# Patient Record
Sex: FEMALE | Race: WHITE | NOT HISPANIC OR LATINO | Employment: FULL TIME | URBAN - METROPOLITAN AREA
[De-identification: names, ages, dates, MRNs, and addresses within clinical notes are randomized per-mention and may not be internally consistent; named-entity substitution may affect disease eponyms.]

---

## 2018-11-26 ENCOUNTER — HOSPITAL ENCOUNTER (EMERGENCY)
Facility: HOSPITAL | Age: 28
Discharge: HOME | End: 2018-11-26
Attending: EMERGENCY MEDICINE | Admitting: EMERGENCY MEDICINE
Payer: COMMERCIAL

## 2018-11-26 VITALS
TEMPERATURE: 98.7 F | HEART RATE: 66 BPM | RESPIRATION RATE: 16 BRPM | OXYGEN SATURATION: 97 % | WEIGHT: 230 LBS | DIASTOLIC BLOOD PRESSURE: 63 MMHG | SYSTOLIC BLOOD PRESSURE: 113 MMHG

## 2018-11-26 DIAGNOSIS — N93.9 VAGINAL BLEEDING: Primary | ICD-10-CM

## 2018-11-26 DIAGNOSIS — R10.2 PELVIC CRAMPING: ICD-10-CM

## 2018-11-26 LAB
BACTERIA UR QL AUTO: ABNORMAL /HPF
BASOPHILS # BLD AUTO: 0.03 THOUSANDS/ΜL (ref 0–0.1)
BASOPHILS NFR BLD AUTO: 0 % (ref 0–1)
BILIRUB UR QL STRIP: NEGATIVE
CLARITY UR: ABNORMAL
COLOR UR: ABNORMAL
EOSINOPHIL # BLD AUTO: 0.2 THOUSAND/ΜL (ref 0–0.61)
EOSINOPHIL NFR BLD AUTO: 2 % (ref 0–6)
ERYTHROCYTE [DISTWIDTH] IN BLOOD BY AUTOMATED COUNT: 13.1 % (ref 11.6–15.1)
EXT PREG TEST URINE: NEGATIVE
GLUCOSE UR STRIP-MCNC: NEGATIVE MG/DL
HCT VFR BLD AUTO: 38.6 % (ref 34.8–46.1)
HGB BLD-MCNC: 12.8 G/DL (ref 11.5–15.4)
HGB UR QL STRIP.AUTO: ABNORMAL
HYALINE CASTS #/AREA URNS LPF: ABNORMAL /LPF
IMM GRANULOCYTES # BLD AUTO: 0.04 THOUSAND/UL (ref 0–0.2)
IMM GRANULOCYTES NFR BLD AUTO: 0 % (ref 0–2)
KETONES UR STRIP-MCNC: ABNORMAL MG/DL
LEUKOCYTE ESTERASE UR QL STRIP: ABNORMAL
LYMPHOCYTES # BLD AUTO: 3.18 THOUSANDS/ΜL (ref 0.6–4.47)
LYMPHOCYTES NFR BLD AUTO: 31 % (ref 14–44)
MCH RBC QN AUTO: 30 PG (ref 26.8–34.3)
MCHC RBC AUTO-ENTMCNC: 33.2 G/DL (ref 31.4–37.4)
MCV RBC AUTO: 91 FL (ref 82–98)
MONOCYTES # BLD AUTO: 0.85 THOUSAND/ΜL (ref 0.17–1.22)
MONOCYTES NFR BLD AUTO: 8 % (ref 4–12)
NEUTROPHILS # BLD AUTO: 6.1 THOUSANDS/ΜL (ref 1.85–7.62)
NEUTS SEG NFR BLD AUTO: 59 % (ref 43–75)
NITRITE UR QL STRIP: NEGATIVE
NON-SQ EPI CELLS URNS QL MICRO: ABNORMAL /HPF
NRBC BLD AUTO-RTO: 0 /100 WBCS
PH UR STRIP.AUTO: 5.5 [PH] (ref 4.5–8)
PLATELET # BLD AUTO: 345 THOUSANDS/UL (ref 149–390)
PMV BLD AUTO: 9.8 FL (ref 8.9–12.7)
PROT UR STRIP-MCNC: ABNORMAL MG/DL
RBC # BLD AUTO: 4.26 MILLION/UL (ref 3.81–5.12)
RBC #/AREA URNS AUTO: ABNORMAL /HPF
SP GR UR STRIP.AUTO: 1.03 (ref 1–1.03)
UROBILINOGEN UR QL STRIP.AUTO: 0.2 E.U./DL
WBC # BLD AUTO: 10.4 THOUSAND/UL (ref 4.31–10.16)
WBC #/AREA URNS AUTO: ABNORMAL /HPF

## 2018-11-26 PROCEDURE — 81001 URINALYSIS AUTO W/SCOPE: CPT

## 2018-11-26 PROCEDURE — 85025 COMPLETE CBC W/AUTO DIFF WBC: CPT | Performed by: EMERGENCY MEDICINE

## 2018-11-26 PROCEDURE — 99284 EMERGENCY DEPT VISIT MOD MDM: CPT

## 2018-11-26 PROCEDURE — 36415 COLL VENOUS BLD VENIPUNCTURE: CPT | Performed by: EMERGENCY MEDICINE

## 2018-11-26 PROCEDURE — 81025 URINE PREGNANCY TEST: CPT | Performed by: EMERGENCY MEDICINE

## 2018-11-26 PROCEDURE — 87086 URINE CULTURE/COLONY COUNT: CPT

## 2018-11-26 NOTE — ED ATTENDING ATTESTATION
Miguel Marcelino DO, saw and evaluated the patient  I have discussed the patient with the resident/non-physician practitioner and agree with the resident's/non-physician practitioner's findings, Plan of Care, and MDM as documented in the resident's/non-physician practitioner's note, except where noted  All available labs and Radiology studies were reviewed  At this point I agree with the current assessment done in the Emergency Department  I have conducted an independent evaluation of this patient a history and physical is as follows:      35 yo female with hx of AUB presents for evaluation of heavy vaginal bleeding going through 2-3 pads/hr with passage of clots  Has mild suprapubic cramping pain  Non radiating, no a/e factors  Imp: menometrorrhagia plan: pelvic exam, hgb  Reassess  Consider oral TXA if pt to be discharged        Critical Care Time  CritCare Time    Procedures

## 2018-11-26 NOTE — DISCHARGE INSTRUCTIONS
Dysfunctional Uterine Bleeding   WHAT YOU NEED TO KNOW:   Dysfunctional uterine bleeding (DUB) is abnormal uterine bleeding that is caused by a problem with your hormones  You may have bleeding from your uterus at times other than your normal monthly period  Your monthly periods may last longer or shorter, and bleeding may be heavier or lighter than usual    DISCHARGE INSTRUCTIONS:   Medicines:   · Hormones  help decrease bleeding by making your monthly periods more regular  Sometimes this medicine may be given as birth control pills  · NSAIDs  help decrease swelling, pain, and fever  This medicine is available with or without a doctor's order  NSAIDs can cause stomach bleeding or kidney problems in certain people  If you take blood thinner medicine, always ask your healthcare provider if NSAIDs are safe for you  Always read the medicine label and follow directions  · Iron supplements  may be given if your blood iron level decreases because of heavy bleeding  Iron may make you constipated  Ask your healthcare provider for ways to prevent or treat constipation  Iron may also make your bowel movements turn dark or black  · Take your medicine as directed  Contact your healthcare provider if you think your medicine is not helping or if you have side effects  Tell him or her if you are allergic to any medicine  Keep a list of the medicines, vitamins, and herbs you take  Include the amounts, and when and why you take them  Bring the list or the pill bottles to follow-up visits  Carry your medicine list with you in case of an emergency  Follow up with your healthcare provider as directed:  Write down your questions so you remember to ask them during your visits  Self-care:   · Apply heat  on your lower abdomen for 20 to 30 minutes every 2 hours for as many days as directed  Heat helps decrease pain and muscle spasms  · Include foods high in iron  if needed   Examples of foods high in iron are leafy green vegetables, beef, pork, liver, eggs, and whole-grain breads and cereals  · Keep a diary of your menstrual cycles  Keep track of the number of tampons or pads you use each day  · Talk to your healthcare provider before you start a weight loss program   You may need to wait until the abnormal bleeding has stopped before you try to lose weight  The amount of iron in your blood should be normal before you lose weight  Contact your healthcare provider if:   · You need to change your sanitary pad or tampon more than once an hour  · Your medicine causes nausea, vomiting, or diarrhea  · You have questions or concerns about your condition or care  Return to the emergency department if:   · You continue to bleed heavily, or you feel faint  © 2017 [employer]  Information is for End User's use only and may not be sold, redistributed or otherwise used for commercial purposes  All illustrations and images included in CareNotes® are the copyrighted property of [employer]  or [employer]  The above information is an  only  It is not intended as medical advice for individual conditions or treatments  Talk to your doctor, nurse or pharmacist before following any medical regimen to see if it is safe and effective for you  Menorrhagia   WHAT YOU NEED TO KNOW:   Menorrhagia is heavy menstrual bleeding for more than 7 days or severe menstrual bleeding for less than 7 days  Your menstrual bleeding and cramping are so heavy that you have trouble doing your usual daily activities  Your monthly period may also occur more often, and you may bleed between periods  Menorrhagia is common in adolescence and around menopause  DISCHARGE INSTRUCTIONS:   Call 911 for any of the following:   · You have chest pain and shortness of breath  · Your heart is fluttering or beating faster than usual for you  Return to the emergency department if:   · You feel dizzy when you stand  · You feel confused  · You have severe abdominal pain, nausea, and vomiting  · Your skin or the whites of your eyes turn yellow  Contact your healthcare provider if:   · You need to change your pad or tampon more than 1 time per hour, for several hours in a row  · You feel more weak and tired than usual      · You have new coldness in your hands and feet  · You have questions or concerns about your condition or care  Medicines: You may need any of the following:  · Iron supplements  may be given if your blood iron level decreases because of heavy bleeding  · NSAIDs , such as ibuprofen, treat menstrual cramps  This medicine is available with or without a doctor's order  NSAIDs can cause stomach bleeding or kidney problems in certain people  If you take blood thinner medicine, always ask your healthcare provider if NSAIDs are safe for you  Always read the medicine label and follow directions  · Hormones  help slow or stop your bleeding and make your monthly periods more regular  This medicine may be given as birth control pills or an intrauterine device (IUD)  · Take your medicine as directed  Contact your healthcare provider if you think your medicine is not helping or if you have side effects  Tell him of her if you are allergic to any medicine  Keep a list of the medicines, vitamins, and herbs you take  Include the amounts, and when and why you take them  Bring the list or the pill bottles to follow-up visits  Carry your medicine list with you in case of an emergency  Manage your symptoms:   · Keep a supply of pads or tampons with you at all times  If possible, stay close to a bathroom  · Apply heat  on your abdomen for 20 to 30 minutes every 2 hours for as many days as directed  Heat helps decrease pain and cramps  Follow up with your healthcare provider as directed: You may need regular pelvic exams with Pap smears to monitor your condition   Write down your questions so you remember to ask them during your visits  © 2017 [employer] Information is for End User's use only and may not be sold, redistributed or otherwise used for commercial purposes  All illustrations and images included in CareNotes® are the copyrighted property of [employer]  or [employer]  The above information is an  only  It is not intended as medical advice for individual conditions or treatments  Talk to your doctor, nurse or pharmacist before following any medical regimen to see if it is safe and effective for you

## 2018-11-26 NOTE — ED PROVIDER NOTES
History  Chief Complaint   Patient presents with    Vaginal Bleeding     pt has been having heavy vaginal bleeding for the past 48 hours going 2-3 maxi pads an hour with clots the size of a half dollar  sent here by obgyn  pt reports taking plan b 1 5 weeks ago  HPI    26-year-old female pm[facility]x medical  in 2018, irregular periods presents for evaluation of vaginal bleeding  Patient says she has had heavy vaginal bleeding for last 2 days, going through 2-3 pads/hr and has noticed small clots  Denies bleeding heavily before  LMP 10/11/2018 and was light but lasted 30 days  She notes lower abdominal cramping that feels similar to a menstrual cycle  Denies headache, lightheadedness, fever, chills, chest pain, shortness of breath, diarrhea, dysuria, or hematuria  None       History reviewed  No pertinent past medical history  Past Surgical History:   Procedure Laterality Date    INDUCED          History reviewed  No pertinent family history  I have reviewed and agree with the history as documented  Social History   Substance Use Topics    Smoking status: Current Every Day Smoker     Packs/day: 0 20    Smokeless tobacco: Never Used    Alcohol use Yes      Comment: socially        Review of Systems   Constitutional: Negative for chills, diaphoresis, fatigue and fever  HENT: Negative for congestion, rhinorrhea and sore throat  Eyes: Negative for photophobia and visual disturbance  Respiratory: Negative for cough, chest tightness and shortness of breath  Cardiovascular: Negative for chest pain and palpitations  Gastrointestinal: Negative for abdominal pain, blood in stool, constipation, diarrhea, nausea and vomiting  Genitourinary: Positive for pelvic pain and vaginal bleeding  Negative for dysuria, frequency and hematuria  Musculoskeletal: Negative for back pain, gait problem, myalgias, neck pain and neck stiffness  Skin: Negative for pallor and rash     Neurological: Negative for weakness, light-headedness, numbness and headaches  Hematological: Negative for adenopathy  Does not bruise/bleed easily  All other systems reviewed and are negative  Physical Exam  ED Triage Vitals   Temperature Pulse Respirations Blood Pressure SpO2   11/26/18 1542 11/26/18 1537 11/26/18 1537 11/26/18 1537 11/26/18 1537   98 7 °F (37 1 °C) 72 18 141/76 98 %      Temp Source Heart Rate Source Patient Position - Orthostatic VS BP Location FiO2 (%)   11/26/18 1542 11/26/18 1537 11/26/18 1647 11/26/18 1647 --   Tympanic Monitor Sitting Right arm       Pain Score       --                  Orthostatic Vital Signs  Vitals:    11/26/18 1537 11/26/18 1647 11/26/18 1700   BP: 141/76 (!) 98/49 113/63   Pulse: 72 70 66   Patient Position - Orthostatic VS:  Sitting Sitting       Physical Exam   Constitutional: She is oriented to person, place, and time  She appears well-developed and well-nourished  No distress  Patient is alert and oriented, appears well and nontoxic, in no acute distress   HENT:   Head: Normocephalic and atraumatic  Mouth/Throat: Oropharynx is clear and moist  No oropharyngeal exudate  Eyes: Pupils are equal, round, and reactive to light  Conjunctivae and EOM are normal    Neck: Normal range of motion  Neck supple  Cardiovascular: Normal rate, regular rhythm, normal heart sounds and intact distal pulses  Pulmonary/Chest: Effort normal and breath sounds normal  No respiratory distress  Abdominal: Soft  Bowel sounds are normal  She exhibits no distension  There is no tenderness  Genitourinary:   Genitourinary Comments: Blood in vaginal vault with small clots, no obvious laceration or trauma, nontender adnexa to palpation  Evacuated 2 small clots and blood from vault   Musculoskeletal: Normal range of motion  Lymphadenopathy:     She has no cervical adenopathy  Neurological: She is alert and oriented to person, place, and time     No facial asymmetry noted, CN 2-12 intact, full ROM of upper and lower extremities, muscle strength 5/5 throughout, DTRs normal, sensation intact throughout, negative finger to nose/Derrick, negative Romberg's, negative Babinski, no gait disturbance noted   Skin: Skin is warm and dry  Capillary refill takes less than 2 seconds  No rash noted  She is not diaphoretic  No erythema  No pallor  Psychiatric: She has a normal mood and affect  Her behavior is normal  Judgment and thought content normal    Nursing note and vitals reviewed  ED Medications  Medications - No data to display    Diagnostic Studies  Results Reviewed     Procedure Component Value Units Date/Time    Urine Microscopic [[id number]]  (Abnormal) Collected:  11/26/18 1750    Lab Status:  Final result Specimen:  Urine from Urine, Clean Catch Updated:  11/26/18 1837     RBC, UA Innumerable (A) /hpf      WBC, UA 10-20 (A) /hpf      Epithelial Cells None Seen /hpf      Bacteria, UA None Seen /hpf      Hyaline Casts, UA None Seen /lpf     Urine culture [[id number]] Collected:  11/26/18 1750    Lab Status:   In process Specimen:  Urine from Urine, Clean Catch Updated:  11/26/18 1837    UA w Reflex to Microscopic w Reflex to Culture [[id number]]  (Abnormal) Collected:  11/26/18 1750    Lab Status:  Final result Specimen:  Urine from Urine, Clean Catch Updated:  11/26/18 1835     Color, UA Hayley     Clarity, UA Turbid     Specific [place], UA 1 026     pH, UA 5 5     Leukocytes, UA Small (A)     Nitrite, UA Negative     Protein, UA 30 (1+) (A) mg/dl      Glucose, UA Negative mg/dl      Ketones, UA Trace (A) mg/dl      Urobilinogen, UA 0 2 E U /dl      Bilirubin, UA Negative     Blood, UA Large (A)    POCT pregnancy, urine [[id number]]  (Normal) Resulted:  11/26/18 1752    Lab Status:  Final result Updated:  11/26/18 1752     EXT PREG TEST UR (Ref: Negative) negative    CBC and differential [[id number]]  (Abnormal) Collected:  11/26/18 1642    Lab Status:  Final result Specimen:  Blood from Arm, Left Updated:  11/26/18 1657     WBC 10 40 (H) Thousand/uL      RBC 4 26 Million/uL      Hemoglobin 12 8 g/dL      Hematocrit 38 6 %      MCV 91 fL      MCH 30 0 pg      MCHC 33 2 g/dL      RDW 13 1 %      MPV 9 8 fL      Platelets 436 Thousands/uL      nRBC 0 /100 WBCs      Neutrophils Relative 59 %      Immat GRANS % 0 %      Lymphocytes Relative 31 %      Monocytes Relative 8 %      Eosinophils Relative 2 %      Basophils Relative 0 %      Neutrophils Absolute 6 10 Thousands/µL      Immature Grans Absolute 0 04 Thousand/uL      Lymphocytes Absolute 3 18 Thousands/µL      Monocytes Absolute 0 85 Thousand/µL      Eosinophils Absolute 0 20 Thousand/µL      Basophils Absolute 0 03 Thousands/µL                  No orders to display         Procedures  Procedures      Phone Consults  ED Phone Contact    ED Course  ED Course as of Nov 26 1839   Mon Nov 26, 2018   1733 Hemoglobin: 12 8   1733 On reassessment, patient continues to feel fine and has remained hemodynamically stable                                MDM  Number of Diagnoses or Management Options  Pelvic cramping:   Vaginal bleeding:   Diagnosis management comments: Impression:  53-year-old female presents for evaluation of vaginal bleeding  Ddx: menstrual cycle, fibroid  Plan: cbc, urinalysis     CritCare Time    Disposition  Final diagnoses:   Vaginal bleeding   Pelvic cramping     Time reflects when diagnosis was documented in both MDM as applicable and the Disposition within this note     Time User Action Codes Description Comment    11/26/2018  5:35 PM Lobishnuenia [place] Add [N93 9] Vaginal bleeding     11/26/2018  5:35 PM Louvenia [place] Add [R10 2] Pelvic cramping       ED Disposition     ED Disposition Condition Comment    Discharge  Aldona Fraise discharge to home/self care      Condition at discharge: Good        Follow-up Information     Follow up With Specialties Details Why Contact Info Additional [address] Obstetrics and Gynecology Schedule an appointment as soon as possible for a visit As needed, If symptoms worsen [facility] [id number]  [phone] YD SJI U MASONIJ REMA, [address] [place] [address], [facility] Emergency Medicine Go to As needed, If symptoms worsen [address] [facility] ED, [address], [address]          There are no discharge medications for this patient  No discharge procedures on file  ED Provider  Attending physically available and evaluated Obi Needs  I managed the patient along with the ED Attending      Electronically Signed by         Zach Santos MD  11/26/18 9287

## 2018-11-27 LAB — BACTERIA UR CULT: NORMAL

## 2020-06-05 ENCOUNTER — OFFICE VISIT (OUTPATIENT)
Dept: URGENT CARE | Age: 30
End: 2020-06-05
Payer: COMMERCIAL

## 2020-06-05 VITALS — TEMPERATURE: 98.3 F | HEART RATE: 76 BPM

## 2020-06-05 DIAGNOSIS — Z11.59 SCREENING FOR VIRAL DISEASE: Primary | ICD-10-CM

## 2020-06-05 PROCEDURE — 99213 OFFICE O/P EST LOW 20 MIN: CPT | Performed by: PHYSICIAN ASSISTANT

## 2020-06-05 PROCEDURE — U0003 INFECTIOUS AGENT DETECTION BY NUCLEIC ACID (DNA OR RNA); SEVERE ACUTE RESPIRATORY SYNDROME CORONAVIRUS 2 (SARS-COV-2) (CORONAVIRUS DISEASE [COVID-19]), AMPLIFIED PROBE TECHNIQUE, MAKING USE OF HIGH THROUGHPUT TECHNOLOGIES AS DESCRIBED BY CMS-2020-01-R: HCPCS | Performed by: PHYSICIAN ASSISTANT

## 2020-06-08 LAB — SARS-COV-2 RNA SPEC QL NAA+PROBE: NOT DETECTED

## 2020-06-10 ENCOUNTER — TELEPHONE (OUTPATIENT)
Dept: OTHER | Facility: OTHER | Age: 30
End: 2020-06-10

## 2020-12-05 ENCOUNTER — OFFICE VISIT (OUTPATIENT)
Dept: URGENT CARE | Age: 30
End: 2020-12-05
Payer: OTHER GOVERNMENT

## 2020-12-05 VITALS
RESPIRATION RATE: 16 BRPM | HEIGHT: 68 IN | TEMPERATURE: 98.7 F | HEART RATE: 78 BPM | OXYGEN SATURATION: 98 % | BODY MASS INDEX: 30.31 KG/M2 | WEIGHT: 200 LBS

## 2020-12-05 DIAGNOSIS — R53.83 FATIGUE, UNSPECIFIED TYPE: ICD-10-CM

## 2020-12-05 DIAGNOSIS — R43.0 LOSS OF SMELL: Primary | ICD-10-CM

## 2020-12-05 PROBLEM — E66.9 OBESITY (BMI 35.0-39.9 WITHOUT COMORBIDITY): Status: ACTIVE | Noted: 2020-03-06

## 2020-12-05 PROBLEM — Z72.0 TOBACCO ABUSE: Status: ACTIVE | Noted: 2020-03-06

## 2020-12-05 PROCEDURE — U0003 INFECTIOUS AGENT DETECTION BY NUCLEIC ACID (DNA OR RNA); SEVERE ACUTE RESPIRATORY SYNDROME CORONAVIRUS 2 (SARS-COV-2) (CORONAVIRUS DISEASE [COVID-19]), AMPLIFIED PROBE TECHNIQUE, MAKING USE OF HIGH THROUGHPUT TECHNOLOGIES AS DESCRIBED BY CMS-2020-01-R: HCPCS

## 2020-12-05 PROCEDURE — G0382 LEV 3 HOSP TYPE B ED VISIT: HCPCS | Performed by: PHYSICIAN ASSISTANT

## 2020-12-08 ENCOUNTER — TELEPHONE (OUTPATIENT)
Dept: URGENT CARE | Facility: MEDICAL CENTER | Age: 30
End: 2020-12-08

## 2020-12-08 LAB — SARS-COV-2 RNA SPEC QL NAA+PROBE: DETECTED

## 2021-08-02 ENCOUNTER — DOCUMENTATION (OUTPATIENT)
Dept: URGENT CARE | Age: 31
End: 2021-08-02

## 2021-08-02 ENCOUNTER — NURSE TRIAGE (OUTPATIENT)
Dept: OTHER | Facility: OTHER | Age: 31
End: 2021-08-02

## 2021-08-02 DIAGNOSIS — Z20.822 EXPOSURE TO COVID-19 VIRUS: Primary | ICD-10-CM

## 2021-08-02 PROCEDURE — U0003 INFECTIOUS AGENT DETECTION BY NUCLEIC ACID (DNA OR RNA); SEVERE ACUTE RESPIRATORY SYNDROME CORONAVIRUS 2 (SARS-COV-2) (CORONAVIRUS DISEASE [COVID-19]), AMPLIFIED PROBE TECHNIQUE, MAKING USE OF HIGH THROUGHPUT TECHNOLOGIES AS DESCRIBED BY CMS-2020-01-R: HCPCS | Performed by: FAMILY MEDICINE

## 2021-08-02 PROCEDURE — U0005 INFEC AGEN DETEC AMPLI PROBE: HCPCS | Performed by: FAMILY MEDICINE

## 2021-08-02 NOTE — TELEPHONE ENCOUNTER
Reason for Disposition   [1] CLOSE CONTACT COVID-19 EXPOSURE within last 14 days AND [2] NO symptoms    Answer Assessment - Initial Assessment Questions  1  Were you within 6 feet or less, for up to 15 minutes or more with a person that has a confirmed COVID-19 test?   Yes     2  What was the date of your exposure? 7/28    3  Are you experiencing any symptoms attributed to the virus?  (Assess for SOB, cough, fever, difficulty breathing)   Denies     4  HIGH RISK: Do you have any history heart or lung conditions, weakened immune system, diabetes, Asthma, CHF, HIV, COPD, Chemo, renal failure, sickle cell, etc?   Denies     5  PREGNANCY: Are you pregnant or did you recently give birth?    Denies    Protocols used: CORONAVIRUS (COVID-19) EXPOSURE-ADULT-AH

## 2021-08-02 NOTE — TELEPHONE ENCOUNTER
Regarding: Covid Exposure / Asymptomatic   ----- Message from [facility] sent at 8/2/2021  4:48 PM EDT -----  "I need to get tested because I had exposure at work, no symptoms  "

## 2023-11-15 ENCOUNTER — APPOINTMENT (OUTPATIENT)
Dept: LAB | Facility: HOSPITAL | Age: 33
End: 2023-11-15
Payer: COMMERCIAL

## 2023-11-15 DIAGNOSIS — N93.9 ABNORMAL UTERINE BLEEDING: ICD-10-CM

## 2023-11-15 DIAGNOSIS — N92.1 PROLONGED MENSTRUATION: ICD-10-CM

## 2023-11-15 PROCEDURE — 36415 COLL VENOUS BLD VENIPUNCTURE: CPT

## 2023-11-15 PROCEDURE — 85245 CLOT FACTOR VIII VW RISTOCTN: CPT

## 2023-11-17 ENCOUNTER — APPOINTMENT (OUTPATIENT)
Dept: LAB | Facility: HOSPITAL | Age: 33
End: 2023-11-17
Payer: COMMERCIAL

## 2023-11-17 DIAGNOSIS — N93.9 ABNORMAL UTERINE BLEEDING: ICD-10-CM

## 2023-11-17 DIAGNOSIS — N92.1 PROLONGED MENSTRUATION: ICD-10-CM

## 2023-11-17 LAB
APTT PPP: 31 SECONDS (ref 23–37)
FIBRINOGEN PPP-MCNC: 479 MG/DL (ref 207–520)
INR PPP: 0.92 (ref 0.84–1.19)
LH SERPL-ACNC: 9 MIU/ML
PROLACTIN SERPL-MCNC: 12.14 NG/ML (ref 3.34–26.72)
PROTHROMBIN TIME: 12.7 SECONDS (ref 11.6–14.5)
TSH SERPL DL<=0.05 MIU/L-ACNC: 2.1 UIU/ML (ref 0.45–4.5)

## 2023-11-17 PROCEDURE — 85730 THROMBOPLASTIN TIME PARTIAL: CPT

## 2023-11-17 PROCEDURE — 36415 COLL VENOUS BLD VENIPUNCTURE: CPT

## 2023-11-17 PROCEDURE — 84402 ASSAY OF FREE TESTOSTERONE: CPT

## 2023-11-17 PROCEDURE — 83002 ASSAY OF GONADOTROPIN (LH): CPT

## 2023-11-17 PROCEDURE — 85384 FIBRINOGEN ACTIVITY: CPT

## 2023-11-17 PROCEDURE — 84443 ASSAY THYROID STIM HORMONE: CPT

## 2023-11-17 PROCEDURE — 84403 ASSAY OF TOTAL TESTOSTERONE: CPT

## 2023-11-17 PROCEDURE — 85610 PROTHROMBIN TIME: CPT

## 2023-11-17 PROCEDURE — 84146 ASSAY OF PROLACTIN: CPT

## 2023-11-18 LAB
TESTOST FREE SERPL-MCNC: 3.1 PG/ML (ref 0–4.2)
TESTOST SERPL-MCNC: 32 NG/DL (ref 8–60)

## 2023-11-19 LAB — VWF:RCO ACT/NOR PPP PL AGG: 99 % (ref 50–200)

## 2023-12-15 ENCOUNTER — HOSPITAL ENCOUNTER (EMERGENCY)
Facility: HOSPITAL | Age: 33
Discharge: HOME | End: 2023-12-15
Attending: EMERGENCY MEDICINE
Payer: COMMERCIAL

## 2023-12-15 VITALS
BODY MASS INDEX: 40.12 KG/M2 | WEIGHT: 263.89 LBS | DIASTOLIC BLOOD PRESSURE: 76 MMHG | OXYGEN SATURATION: 97 % | SYSTOLIC BLOOD PRESSURE: 130 MMHG | RESPIRATION RATE: 18 BRPM | HEART RATE: 98 BPM | TEMPERATURE: 98.8 F

## 2023-12-15 DIAGNOSIS — N93.8 DYSFUNCTIONAL UTERINE BLEEDING: Primary | ICD-10-CM

## 2023-12-15 LAB
ANION GAP SERPL CALCULATED.3IONS-SCNC: 8 MMOL/L
BACTERIA UR QL AUTO: ABNORMAL /HPF
BASOPHILS # BLD AUTO: 0.06 THOUSANDS/ÂΜL (ref 0–0.1)
BASOPHILS NFR BLD AUTO: 1 % (ref 0–1)
BILIRUB UR QL STRIP: NEGATIVE
BUN SERPL-MCNC: 15 MG/DL (ref 5–25)
CALCIUM SERPL-MCNC: 9 MG/DL (ref 8.4–10.2)
CHLORIDE SERPL-SCNC: 104 MMOL/L (ref 96–108)
CLARITY UR: ABNORMAL
CO2 SERPL-SCNC: 27 MMOL/L (ref 21–32)
COLOR UR: ABNORMAL
CREAT SERPL-MCNC: 1.08 MG/DL (ref 0.6–1.3)
EOSINOPHIL # BLD AUTO: 0.21 THOUSAND/ÂΜL (ref 0–0.61)
EOSINOPHIL NFR BLD AUTO: 2 % (ref 0–6)
ERYTHROCYTE [DISTWIDTH] IN BLOOD BY AUTOMATED COUNT: 13.1 % (ref 11.6–15.1)
EXT PREGNANCY TEST URINE: NEGATIVE
EXT. CONTROL: NORMAL
GFR SERPL CREATININE-BSD FRML MDRD: 68 ML/MIN/1.73SQ M
GLUCOSE SERPL-MCNC: 93 MG/DL (ref 65–140)
GLUCOSE UR STRIP-MCNC: NEGATIVE MG/DL
HCT VFR BLD AUTO: 40.2 % (ref 34.8–46.1)
HGB BLD-MCNC: 13.1 G/DL (ref 11.5–15.4)
HGB UR QL STRIP.AUTO: 250
IMM GRANULOCYTES # BLD AUTO: 0.05 THOUSAND/UL (ref 0–0.2)
IMM GRANULOCYTES NFR BLD AUTO: 1 % (ref 0–2)
KETONES UR STRIP-MCNC: NEGATIVE MG/DL
LEUKOCYTE ESTERASE UR QL STRIP: 100
LYMPHOCYTES # BLD AUTO: 3.28 THOUSANDS/ÂΜL (ref 0.6–4.47)
LYMPHOCYTES NFR BLD AUTO: 30 % (ref 14–44)
MCH RBC QN AUTO: 27.8 PG (ref 26.8–34.3)
MCHC RBC AUTO-ENTMCNC: 32.6 G/DL (ref 31.4–37.4)
MCV RBC AUTO: 85 FL (ref 82–98)
MONOCYTES # BLD AUTO: 0.62 THOUSAND/ÂΜL (ref 0.17–1.22)
MONOCYTES NFR BLD AUTO: 6 % (ref 4–12)
NEUTROPHILS # BLD AUTO: 6.68 THOUSANDS/ÂΜL (ref 1.85–7.62)
NEUTS SEG NFR BLD AUTO: 60 % (ref 43–75)
NITRITE UR QL STRIP: NEGATIVE
NON-SQ EPI CELLS URNS QL MICRO: ABNORMAL /HPF
NRBC BLD AUTO-RTO: 0 /100 WBCS
PH UR STRIP.AUTO: 6 [PH]
PLATELET # BLD AUTO: 437 THOUSANDS/UL (ref 149–390)
PMV BLD AUTO: 8.9 FL (ref 8.9–12.7)
POTASSIUM SERPL-SCNC: 3.7 MMOL/L (ref 3.5–5.3)
PROT UR STRIP-MCNC: ABNORMAL MG/DL
RBC # BLD AUTO: 4.71 MILLION/UL (ref 3.81–5.12)
RBC #/AREA URNS AUTO: ABNORMAL /HPF
SODIUM SERPL-SCNC: 139 MMOL/L (ref 135–147)
SP GR UR STRIP.AUTO: 1.02 (ref 1–1.04)
UROBILINOGEN UA: NEGATIVE MG/DL
WBC # BLD AUTO: 10.9 THOUSAND/UL (ref 4.31–10.16)
WBC #/AREA URNS AUTO: ABNORMAL /HPF

## 2023-12-15 PROCEDURE — 36415 COLL VENOUS BLD VENIPUNCTURE: CPT

## 2023-12-15 PROCEDURE — 85025 COMPLETE CBC W/AUTO DIFF WBC: CPT

## 2023-12-15 PROCEDURE — 81003 URINALYSIS AUTO W/O SCOPE: CPT

## 2023-12-15 PROCEDURE — 99284 EMERGENCY DEPT VISIT MOD MDM: CPT

## 2023-12-15 PROCEDURE — 81001 URINALYSIS AUTO W/SCOPE: CPT

## 2023-12-15 PROCEDURE — 80048 BASIC METABOLIC PNL TOTAL CA: CPT

## 2023-12-15 PROCEDURE — 87086 URINE CULTURE/COLONY COUNT: CPT

## 2023-12-15 PROCEDURE — 87147 CULTURE TYPE IMMUNOLOGIC: CPT

## 2023-12-15 PROCEDURE — 81025 URINE PREGNANCY TEST: CPT

## 2023-12-15 RX ORDER — MEDROXYPROGESTERONE ACETATE 10 MG/1
10 TABLET ORAL DAILY
COMMUNITY
Start: 2023-10-26

## 2023-12-15 RX ORDER — BUSPIRONE HYDROCHLORIDE 5 MG/1
5 TABLET ORAL 2 TIMES DAILY
COMMUNITY

## 2023-12-15 RX ORDER — QUETIAPINE FUMARATE 50 MG/1
50 TABLET, FILM COATED ORAL
COMMUNITY

## 2023-12-15 RX ORDER — MEDROXYPROGESTERONE ACETATE 5 MG/1
5 TABLET ORAL DAILY
Qty: 14 TABLET | Refills: 0 | Status: SHIPPED | OUTPATIENT
Start: 2023-12-15 | End: 2023-12-15

## 2023-12-15 RX ORDER — MEDROXYPROGESTERONE ACETATE 5 MG/1
5 TABLET ORAL DAILY
Qty: 14 TABLET | Refills: 0 | Status: SHIPPED | OUTPATIENT
Start: 2023-12-15 | End: 2023-12-29

## 2023-12-15 RX ORDER — ATOMOXETINE 25 MG/1
25 CAPSULE ORAL DAILY
COMMUNITY

## 2023-12-15 RX ORDER — BUPROPION HYDROCHLORIDE 200 MG/1
200 TABLET, EXTENDED RELEASE ORAL 2 TIMES DAILY
COMMUNITY

## 2023-12-15 NOTE — Clinical Note
Sweta Pretty was seen and treated in our emergency department on 12/15/2023. No restrictions            Diagnosis:     Maia  . She may return on this date: 12/16/2023         If you have any questions or concerns, please don't hesitate to call.       Augusto Diaz PA-C    ______________________________           _______________          _______________  Hospital Representative                              Date                                Time

## 2023-12-15 NOTE — ED PROVIDER NOTES
History  Chief Complaint   Patient presents with    Vaginal Bleeding     Patient says she has had continuous vaginal bleeding for about a year. In the past year the patient has been incarcerated, in rehab, and now in a jail house; 6 months in recovery. Patient says because of all of these transitions she has possibly "fallen through the cracks"; was [facility] a [facility] and given something to stop the bleeding but the jail house could not get her to any follow up appointments. Patient is a 62-year-old female coming in for evaluation of vaginal bleeding. States has been ongoing issue over the last year or so, but has been unable to follow-up with due to incarceration, rehab, and jail houses. Patient has recently followed with OB/GYN, who started her on a short course of Provera, however has ceased at this time as the medication has run out, and was supposed to have an appointment with OB/GYN today, however due to the vaginal bleeding started, was told to go to the emergency room. Patient has no systemic symptoms at this time, does have occasional lightheadedness, when standing, but no chest pain, or shortness of breath. Patient reports that she is going through approximately a pad for every half an hour. Vaginal Bleeding  Associated symptoms: dizziness    Associated symptoms: no abdominal pain, no dysuria, no fatigue, no fever, no nausea and no vaginal discharge        Prior to Admission Medications   Prescriptions Last Dose Informant Patient Reported? Taking?    QUEtiapine (SEROquel) 50 mg tablet   Yes No   Sig: Take 50 mg by mouth   atoMOXetine (STRATTERA) 25 mg capsule   Yes No   Sig: Take 25 mg by mouth daily   buPROPion (WELLBUTRIN SR) 200 MG 12 hr tablet   Yes No   Sig: Take 200 mg by mouth 2 (two) times a day   busPIRone (BUSPAR) 5 mg tablet   Yes No   Sig: Take 5 mg by mouth 2 (two) times a day   medroxyPROGESTERone (PROVERA) 10 mg tablet   Yes Yes   Sig: Take 10 mg by mouth daily      Facility-Administered Medications: None       Past Medical History:   Diagnosis Date    Anxiety     Depression        Past Surgical History:   Procedure Laterality Date    INDUCED          History reviewed. No pertinent family history. I have reviewed and agree with the history as documented. E-Cigarette/Vaping     E-Cigarette/Vaping Substances     Social History     Tobacco Use    Smoking status: Former     Current packs/day: 0.20     Types: Cigarettes    Smokeless tobacco: Never   Substance Use Topics    Alcohol use: Yes     Comment: socially    Drug use: No       Review of Systems   Constitutional:  Negative for chills, fatigue and fever. Gastrointestinal:  Negative for abdominal pain, nausea and vomiting. Genitourinary:  Positive for vaginal bleeding. Negative for decreased urine volume, dysuria, pelvic pain, vaginal discharge and vaginal pain. Neurological:  Positive for dizziness. Physical Exam  Physical Exam  Vitals reviewed. Constitutional:       Appearance: Normal appearance. She is normal weight. HENT:      Head: Normocephalic and atraumatic. Right Ear: External ear normal.      Left Ear: External ear normal.      Nose: Nose normal.   Eyes:      Conjunctiva/sclera: Conjunctivae normal.   Cardiovascular:      Rate and Rhythm: Normal rate. Pulmonary:      Effort: Pulmonary effort is normal.   Abdominal:      Palpations: Abdomen is soft. Tenderness: There is no abdominal tenderness. There is no guarding. Musculoskeletal:         General: Normal range of motion. Cervical back: Normal range of motion. Skin:     General: Skin is warm and dry. Neurological:      Mental Status: She is alert.          Vital Signs  ED Triage Vitals [12/15/23 1552]   Temperature Pulse Respirations Blood Pressure SpO2   98.8 °F (37.1 °C) (!) 118 18 130/76 97 %      Temp Source Heart Rate Source Patient Position - Orthostatic VS BP Location FiO2 (%)   Tympanic Monitor Sitting Left arm --      Pain Score       --           Vitals:    12/15/23 1552 12/15/23 1728   BP: 130/76    Pulse: (!) 118 98   Patient Position - Orthostatic VS: Sitting          Visual Acuity      ED Medications  Medications - No data to display    Diagnostic Studies  Results Reviewed       Procedure Component Value Units Date/Time    Urine Microscopic [[id number]]  (Abnormal) Collected: 12/15/23 1633    Lab Status: Final result Specimen: Urine, Clean Catch Updated: 12/15/23 1749     RBC, UA Innumerable /hpf      WBC, UA 20-30 /hpf      Epithelial Cells Occasional /hpf      Bacteria, UA Occasional /hpf     Urine culture [[id number]] Collected: 12/15/23 1633    Lab Status:  In process Specimen: Urine, Clean Catch Updated: 12/15/23 7303    Basic metabolic panel [[id number]] Collected: 12/15/23 1632    Lab Status: Final result Specimen: Blood from Arm, Right Updated: 12/15/23 1709     Sodium 139 mmol/L      Potassium 3.7 mmol/L      Chloride 104 mmol/L      CO2 27 mmol/L      ANION GAP 8 mmol/L      BUN 15 mg/dL      Creatinine 1.08 mg/dL      Glucose 93 mg/dL      Calcium 9.0 mg/dL      eGFR 68 ml/min/1.73sq m     Narrative:      [facility] guidelines for Chronic Kidney Disease (CKD):     Stage 1 with normal or high GFR (GFR > 90 mL/min/1.73 square meters)    Stage 2 Mild CKD (GFR = 60-89 mL/min/1.73 square meters)    Stage 3A Moderate CKD (GFR = 45-59 mL/min/1.73 square meters)    Stage 3B Moderate CKD (GFR = 30-44 mL/min/1.73 square meters)    Stage 4 Severe CKD (GFR = 15-29 mL/min/1.73 square meters)    Stage 5 End Stage CKD (GFR <15 mL/min/1.73 square meters)  Note: GFR calculation is accurate only with a steady state creatinine    UA w Reflex to Microscopic w Reflex to Culture [[id number]]  (Abnormal) Collected: 12/15/23 1633    Lab Status: Final result Specimen: Urine, Clean Catch Updated: 12/15/23 1704     Color, UA Red     Clarity, UA Bloody     Specific Gravity, UA 1.025     pH, UA 6.0 Leukocytes, .0     Nitrite, UA Negative     Protein,  (2+) mg/dl      Glucose, UA Negative mg/dl      Ketones, UA Negative mg/dl      Bilirubin, UA Negative     Occult Blood, .0     UROBILINOGEN UA Negative mg/dL     CBC and differential [[id number]]  (Abnormal) Collected: 12/15/23 1632    Lab Status: Final result Specimen: Blood from Arm, Right Updated: 12/15/23 1653     WBC 10.90 Thousand/uL      RBC 4.71 Million/uL      Hemoglobin 13.1 g/dL      Hematocrit 40.2 %      MCV 85 fL      MCH 27.8 pg      MCHC 32.6 g/dL      RDW 13.1 %      MPV 8.9 fL      Platelets 706 Thousands/uL      nRBC 0 /100 WBCs      Neutrophils Relative 60 %      Immat GRANS % 1 %      Lymphocytes Relative 30 %      Monocytes Relative 6 %      Eosinophils Relative 2 %      Basophils Relative 1 %      Neutrophils Absolute 6.68 Thousands/µL      Immature Grans Absolute 0.05 Thousand/uL      Lymphocytes Absolute 3.28 Thousands/µL      Monocytes Absolute 0.62 Thousand/µL      Eosinophils Absolute 0.21 Thousand/µL      Basophils Absolute 0.06 Thousands/µL     POCT pregnancy, urine [[id number]]  (Normal) Resulted: 12/15/23 1633    Lab Status: Final result Updated: 12/15/23 1633     EXT Preg Test, Ur Negative     Control Valid                   No orders to display              Procedures  Procedures         ED Course  ED Course as of 12/15/23 1857   Fri Dec 15, 2023   1655 Hemoglobin: 13.1   1750 RBC, UA(!): Innumerable   1750 Epithelial Cells: Occasional   1750 Bacteria, UA: Occasional                               SBIRT 20yo+      Flowsheet Row Most Recent Value   Initial Alcohol Screen: US AUDIT-C     1. How often do you have a drink containing alcohol? 0 Filed at: 12/15/2023 1558   2. How many drinks containing alcohol do you have on a typical day you are drinking? 0 Filed at: 12/15/2023 1558   3a. Male UNDER 65: How often do you have five or more drinks on one occasion? 0 Filed at: 12/15/2023 1558   3b.  FEMALE Any Age, or MALE 65+: How often do you have 4 or more drinks on one occassion? 0 Filed at: 12/15/2023 1558   Audit-C Score 0 Filed at: 12/15/2023 1558   FREEDOM: How many times in the past year have you. .. Used an illegal drug or used a prescription medication for non-medical reasons? Never Filed at: 12/15/2023 1558                      Medical Decision Making  Patient is a 79-year-old female coming in for evaluation of vaginal bleeding. Has been ongoing issue for the last year, does see OB, but sent to the emergency department. At this time, does not have any sign of significant drop in hemoglobin. Patient reports that Provera did work for her, will start her on a low-dose, and have her follow-up with OB/GYN for further evaluation of her recent lab work. Was able to walk out of her department without any signs of distress    Amount and/or Complexity of Data Reviewed  Labs: ordered. Decision-making details documented in ED Course. Risk  Prescription drug management. Disposition  Final diagnoses:   Dysfunctional uterine bleeding     Time reflects when diagnosis was documented in both MDM as applicable and the Disposition within this note       Time User Action Codes Description Comment    12/15/2023  5:25 PM Mervin Mohan Add [N93.8] Dysfunctional uterine bleeding           ED Disposition       ED Disposition   Discharge    Condition   Stable    Date/Time   Fri Dec 15, 2023 [id number] [facility] discharge to home/self care.                    Follow-up Information       Follow up With Specialties Details Why Contact Info Additional Information    Niyah Gilmore MD Family Medicine   [address] [place] [id number]  [phone] [address] [facility] Emergency Department Emergency Medicine  As needed, If symptoms worsen [id number] [id number] [facility] Emergency Department            Discharge Medication List as of 12/15/2023  5:46 PM        CONTINUE these medications which have CHANGED    Details   !! medroxyPROGESTERone (Provera) 5 mg tablet Take 1 tablet (5 mg total) by mouth daily for 14 days, Starting Fri 12/15/2023, Until Fri 12/29/2023, Normal       !! - Potential duplicate medications found. Please discuss with provider. CONTINUE these medications which have NOT CHANGED    Details   !! medroxyPROGESTERone (PROVERA) 10 mg tablet Take 10 mg by mouth daily, Starting Thu 10/26/2023, Historical Med      atoMOXetine (STRATTERA) 25 mg capsule Take 25 mg by mouth daily, Historical Med      buPROPion (WELLBUTRIN SR) 200 MG 12 hr tablet Take 200 mg by mouth 2 (two) times a day, Historical Med      busPIRone (BUSPAR) 5 mg tablet Take 5 mg by mouth 2 (two) times a day, Historical Med      QUEtiapine (SEROquel) 50 mg tablet Take 50 mg by mouth, Historical Med       !! - Potential duplicate medications found. Please discuss with provider. No discharge procedures on file.     PDMP Review       None            ED Provider  Electronically Signed by             Ulises Cordoba PA-C  12/15/23 0668

## 2023-12-15 NOTE — Clinical Note
Laurent Aguilar was seen and treated in our emergency department on 12/15/2023. No restrictions            Diagnosis:     Maia  . She may return on this date: 12/16/2023         If you have any questions or concerns, please don't hesitate to call.       aJiro Gloria PA-C    ______________________________           _______________          _______________  Hospital Representative                              Date                                Time

## 2023-12-17 LAB
BACTERIA UR CULT: ABNORMAL
BACTERIA UR CULT: ABNORMAL

## 2024-02-21 PROBLEM — Z11.59 SCREENING FOR VIRAL DISEASE: Status: RESOLVED | Noted: 2020-06-05 | Resolved: 2024-02-21

## 2024-11-19 ENCOUNTER — OFFICE VISIT (OUTPATIENT)
Dept: FAMILY MEDICINE CLINIC | Facility: CLINIC | Age: 34
End: 2024-11-19

## 2024-11-19 VITALS
HEART RATE: 76 BPM | BODY MASS INDEX: 41.93 KG/M2 | TEMPERATURE: 98.2 F | OXYGEN SATURATION: 96 % | HEIGHT: 70 IN | RESPIRATION RATE: 18 BRPM | SYSTOLIC BLOOD PRESSURE: 122 MMHG | WEIGHT: 292.9 LBS | DIASTOLIC BLOOD PRESSURE: 76 MMHG

## 2024-11-19 DIAGNOSIS — E66.01 MORBID OBESITY (HCC): ICD-10-CM

## 2024-11-19 DIAGNOSIS — E55.9 VITAMIN D DEFICIENCY: ICD-10-CM

## 2024-11-19 DIAGNOSIS — Z11.4 SCREENING FOR HIV (HUMAN IMMUNODEFICIENCY VIRUS): ICD-10-CM

## 2024-11-19 DIAGNOSIS — Z11.59 NEED FOR HEPATITIS C SCREENING TEST: ICD-10-CM

## 2024-11-19 DIAGNOSIS — M25.561 CHRONIC PAIN OF RIGHT KNEE: Primary | ICD-10-CM

## 2024-11-19 DIAGNOSIS — R60.0 BILATERAL LEG EDEMA: ICD-10-CM

## 2024-11-19 DIAGNOSIS — Z31.9 PATIENT DESIRES PREGNANCY: Chronic | ICD-10-CM

## 2024-11-19 DIAGNOSIS — G89.29 CHRONIC PAIN OF RIGHT KNEE: Primary | ICD-10-CM

## 2024-11-19 DIAGNOSIS — Z72.0 TOBACCO ABUSE: ICD-10-CM

## 2024-11-19 DIAGNOSIS — G56.01 CARPAL TUNNEL SYNDROME OF RIGHT WRIST: ICD-10-CM

## 2024-11-19 DIAGNOSIS — F41.1 GENERALIZED ANXIETY DISORDER: ICD-10-CM

## 2024-11-19 DIAGNOSIS — Z87.898 HISTORY OF ALCOHOL USE DISORDER: ICD-10-CM

## 2024-11-19 PROBLEM — A74.9 CHLAMYDIA: Status: ACTIVE | Noted: 2024-01-26

## 2024-11-19 PROBLEM — F60.9 PERSONALITY DISORDER (HCC): Status: ACTIVE | Noted: 2024-11-19

## 2024-11-19 PROBLEM — A74.9 CHLAMYDIA: Status: RESOLVED | Noted: 2024-01-26 | Resolved: 2024-11-19

## 2024-11-19 PROBLEM — F33.1 MAJOR DEPRESSIVE DISORDER, RECURRENT EPISODE, MODERATE (HCC): Status: ACTIVE | Noted: 2023-04-06

## 2024-11-19 PROCEDURE — 99204 OFFICE O/P NEW MOD 45 MIN: CPT

## 2024-11-19 RX ORDER — QUETIAPINE FUMARATE 25 MG/1
1 TABLET, FILM COATED ORAL DAILY
COMMUNITY
Start: 2024-11-05

## 2024-11-19 RX ORDER — DICYCLOMINE HCL 20 MG
20 TABLET ORAL EVERY 8 HOURS
COMMUNITY
Start: 2024-07-08 | End: 2024-11-19

## 2024-11-19 RX ORDER — HYDROXYZINE PAMOATE 50 MG/1
50 CAPSULE ORAL 3 TIMES DAILY PRN
COMMUNITY
Start: 2024-08-30 | End: 2024-11-19

## 2024-11-19 RX ORDER — BUPROPION HYDROCHLORIDE 300 MG/1
300 TABLET ORAL EVERY MORNING
COMMUNITY
Start: 2024-08-30

## 2024-11-19 RX ORDER — FAMOTIDINE 20 MG/1
20 TABLET, FILM COATED ORAL 2 TIMES DAILY
COMMUNITY
Start: 2024-07-08 | End: 2024-11-19

## 2024-11-19 RX ORDER — QUETIAPINE FUMARATE 100 MG/1
100 TABLET, FILM COATED ORAL
COMMUNITY
Start: 2024-11-05

## 2024-11-19 NOTE — PROGRESS NOTES
Name: Maia Cortes      : 1990      MRN: [id number]  Encounter Provider: HARMAN Wilks  Encounter Date: 2024   Encounter department: [facility]  :  Assessment & Plan  Chronic pain of right knee    Orders:    XR knee 3 vw right non injury; Future    Ambulatory Referral to Physical Therapy; Future    Diclofenac Sodium (VOLTAREN) 1 %; Apply 2 g topically 4 (four) times a day    Bilateral leg edema  Recommend compression stockings & leg elevation       Screening for HIV (human immunodeficiency virus)    Orders:    HIV 1/2 AG/AB w Reflex [facility] for 2 yr old and above; Future    Need for hepatitis C screening test    Orders:    Hepatitis C Antibody; Future    Tobacco abuse  Quit smoking. Vapes- a couple of puffs per day. Discouraged vaping          Morbid obesity (HCC)      Orders:    Comprehensive metabolic panel; Future    CBC and differential; Future    TSH, 3rd generation with Free T4 reflex; Future    Lipid panel; Future    Ambulatory Referral to Weight Management; Future    Patient desires pregnancy  F/u obgyn       Carpal tunnel syndrome of right wrist  - Wrist splints, especially at night  - OT/PT    Orders:    Ambulatory Referral to Physical Therapy; Future    Vitamin D deficiency    Orders:    Vitamin D 25 hydroxy; Future    History of alcohol use disorder  Sober for over 1.5 years, doing well.          Generalized anxiety disorder  Doing well on seroquel and wellbutrin, follows with [facility] psychiatry.              BMI Counseling: Body mass index is 42.63 kg/m². The BMI is above normal. Nutrition recommendations include decreasing portion sizes, encouraging healthy choices of fruits and vegetables, decreasing fast food intake, consuming healthier snacks, limiting drinks that contain sugar, moderation in carbohydrate intake, increasing intake of lean protein, reducing intake of saturated and trans fat and reducing intake of cholesterol. Exercise  recommendations include moderate physical activity 150 minutes/week. No pharmacotherapy was ordered. Rationale for BMI follow-up plan is due to patient being overweight or obese.       History of Present Illness     Maia Cortes is a 34 y.o. female  has a past medical history of Anxiety and Depression.  has a past surgical history that includes Induced .    Pt presents today to establish care. She reports bilateral lower leg edema present for a couple of months.   Right knee gets the most swollen to the point that she can barely bend it. 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             Review of Systems   Constitutional:  Positive for fatigue. Negative for chills and fever.   HENT:  Negative for ear pain and sore throat.    Eyes:  Negative for pain and visual disturbance.   Respiratory:  Negative for cough and shortness of breath.    Cardiovascular:  Negative for chest pain and palpitations.   Gastrointestinal:  Negative for abdominal pain and vomiting.   Genitourinary:  Positive for menstrual problem. Negative for dysuria and hematuria.   Musculoskeletal:  Positive for arthralgias. Negative for back pain.   Skin:  Negative for color change and rash.   Neurological:  Negative for seizures and syncope.   All other systems reviewed and are negative.    Past Medical History   Past Medical History:   Diagnosis Date    Anxiety     Carpal tunnel syndrome of right wrist 2024    Chlamydia 2024    Depression     Mild scoliosis 2016     Past Surgical History:   Procedure Laterality Date    INDUCED        Family History   Problem Relation Age of Onset    Diabetes Paternal Grandmother     Diabetes Paternal Grandfather       reports that she has quit smoking. Her smoking use included cigarettes. She has never used smokeless tobacco. She reports that she does not currently use alcohol. She reports that she does not use drugs.  Current Outpatient Medications on File Prior to  "Visit   Medication Sig Dispense Refill    buPROPion (WELLBUTRIN XL) 300 mg 24 hr tablet Take 300 mg by mouth every morning      QUEtiapine (SEROquel) 100 mg tablet Take 100 mg by mouth daily at bedtime      QUEtiapine (SEROquel) 25 mg tablet Take 1 tablet by mouth in the morning      [DISCONTINUED] buPROPion (WELLBUTRIN SR) 200 MG 12 hr tablet Take 200 mg by mouth 2 (two) times a day      [DISCONTINUED] dicyclomine (BENTYL) 20 mg tablet Take 20 mg by mouth every 8 (eight) hours      [DISCONTINUED] famotidine (PEPCID) 20 mg tablet Take 20 mg by mouth 2 (two) times a day      [DISCONTINUED] hydrOXYzine pamoate (VISTARIL) 50 mg capsule Take 50 mg by mouth Three times daily as needed      [DISCONTINUED] QUEtiapine (SEROquel) 50 mg tablet Take 50 mg by mouth      [DISCONTINUED] atoMOXetine (STRATTERA) 25 mg capsule Take 25 mg by mouth daily (Patient not taking: Reported on 11/19/2024)      [DISCONTINUED] busPIRone (BUSPAR) 5 mg tablet Take 5 mg by mouth 2 (two) times a day (Patient not taking: Reported on 11/19/2024)      [DISCONTINUED] medroxyPROGESTERone (PROVERA) 10 mg tablet Take 10 mg by mouth daily (Patient not taking: Reported on 11/19/2024)      [DISCONTINUED] medroxyPROGESTERone (Provera) 5 mg tablet Take 1 tablet (5 mg total) by mouth daily for 14 days 14 tablet 0     No current facility-administered medications on file prior to visit.   No Known Allergies        Objective   /76 (BP Location: Right arm, Patient Position: Sitting, Cuff Size: Large)   Pulse 76   Temp 98.2 °F (36.8 °C) (Temporal)   Resp 18   Ht 5' 9.5\" (1.765 m)   Wt 133 kg (292 lb 14.4 oz)   LMP  (LMP Unknown)   SpO2 96%   BMI 42.63 kg/m²      Physical Exam  Vitals and nursing note reviewed.   Constitutional:       General: She is not in acute distress.     Appearance: Normal appearance. She is well-developed. She is obese.   HENT:      Head: Normocephalic and atraumatic.      Right Ear: External ear normal.      Left Ear: External " ear normal.      Nose: Nose normal.   Eyes:      Conjunctiva/sclera: Conjunctivae normal.   Cardiovascular:      Rate and Rhythm: Normal rate and regular rhythm.      Pulses: Normal pulses.      Heart sounds: Normal heart sounds. No murmur heard.  Pulmonary:      Effort: Pulmonary effort is normal. No respiratory distress.      Breath sounds: Normal breath sounds.   Abdominal:      Palpations: Abdomen is soft.      Tenderness: There is no abdominal tenderness.   Musculoskeletal:         General: Normal range of motion.      Cervical back: Normal range of motion and neck supple.      Right knee: Crepitus present.      Left knee: Normal.   Skin:     General: Skin is warm and dry.   Neurological:      Mental Status: She is alert and oriented to person, place, and time. Mental status is at baseline.   Psychiatric:         Mood and Affect: Mood normal.         Behavior: Behavior normal.         Thought Content: Thought content normal.         Judgment: Judgment normal.

## 2024-11-19 NOTE — ASSESSMENT & PLAN NOTE
Orders:    Comprehensive metabolic panel; Future    CBC and differential; Future    TSH, 3rd generation with Free T4 reflex; Future    Lipid panel; Future    Ambulatory Referral to Weight Management; Future

## 2024-11-19 NOTE — ASSESSMENT & PLAN NOTE
Orders:    XR knee 3 vw right non injury; Future    Ambulatory Referral to Physical Therapy; Future    Diclofenac Sodium (VOLTAREN) 1 %; Apply 2 g topically 4 (four) times a day

## 2024-11-19 NOTE — ASSESSMENT & PLAN NOTE
- Wrist splints, especially at night  - OT/PT    Orders:    Ambulatory Referral to Physical Therapy; Future

## 2024-11-19 NOTE — LETTER
November 19, 2024     Patient: Maia Cortes  YOB: 1990  Date of Visit: 11/19/2024      To Whom it May Concern:    Maia Cortes is under my professional care. Maia was seen in my office on 11/19/2024. Maia needs a keyboard and mouse specific for patients with carpal tunnel.    If you have any questions or concerns, please don't hesitate to call.         Sincerely,          HARMAN Wilks        CC: No Recipients

## 2024-12-16 ENCOUNTER — TELEPHONE (OUTPATIENT)
Dept: BARIATRICS | Facility: CLINIC | Age: 34
End: 2024-12-16

## 2025-01-31 ENCOUNTER — OFFICE VISIT (OUTPATIENT)
Age: 35
End: 2025-01-31
Payer: COMMERCIAL

## 2025-01-31 VITALS
RESPIRATION RATE: 16 BRPM | DIASTOLIC BLOOD PRESSURE: 72 MMHG | BODY MASS INDEX: 42.89 KG/M2 | HEIGHT: 69 IN | WEIGHT: 289.6 LBS | TEMPERATURE: 96.5 F | HEART RATE: 89 BPM | SYSTOLIC BLOOD PRESSURE: 120 MMHG

## 2025-01-31 DIAGNOSIS — E66.01 MORBID OBESITY (HCC): ICD-10-CM

## 2025-01-31 DIAGNOSIS — E66.01 OBESITY, CLASS III, BMI 40-49.9 (MORBID OBESITY) (HCC): Primary | ICD-10-CM

## 2025-01-31 PROCEDURE — 99244 OFF/OP CNSLTJ NEW/EST MOD 40: CPT | Performed by: PHYSICIAN ASSISTANT

## 2025-01-31 RX ORDER — DIPHENOXYLATE HYDROCHLORIDE AND ATROPINE SULFATE 2.5; .025 MG/1; MG/1
1 TABLET ORAL DAILY
COMMUNITY

## 2025-01-31 NOTE — PATIENT INSTRUCTIONS
I have sent Zebound to your pharmacy. The prior authorization process will been done through our prior authorization team and can take up to 3-4 weeks to process through the insurance.     - Start Zepbound 2.5 mg subcutaneously weekly. After you have taken the second pen, please give me an update, as we will likely increase the dose the next month if you are tolerating it well.    - Side effects of Zepbound include nausea, vomiting, diarrhea, or constipation. Keep an eye on your heart rate while on Zepbound. If you resting heart rate is greater than 100 beats per minutes, please notify me. If you develop severe abdominal pain, stop Zepbound and go to the emergency room, as that could be a sign of pancreatitis.     - Please notify me if you have surgery, upper endoscopy, or colonoscopy scheduled, as we typically hold Zepbound for one week prior to the procedure.     - Zepbound can reduce the effectiveness of oral hormonal birth control (birth control pills). Recommend a barrier backup method such as condoms to prevent pregnancy.       GLP-1 Managing Side Effects Tips:  - focus on small frequent meals  - moderate sugar and fat intake  - change the injection site (abdomen --> thigh--> back of arm)  - eat protein, crackers, mints and bertha-based drinks  - nighttime injections  - drink plenty of water  - consider fiber supplements like miralax    Tips for Nausea:  - Don't drink and eat simultaneously: separate fluids 30-60 minutes before and after meals when experiencing nausea or if you notice you become full quickly  - Choose mild smelling foods- strong smells can exacerbate nausea  - Bertha and peppermint- consider drinking bertha or peppermint tea, which can help alleviate symptoms  - Eat- don't skip meals, if you have nausea, it is understandable that you may not feel like eating. However, skipping meals is generally not recommended as this can lead to lower blood sugar and fatigue. Furthermore, it is essential to  consume adequate protein during weight loss. You can opt for a protein shake, a meal replacement supplement, bone broth or soup.     Tips for Constipation:   - Drink plenty of water  - Eat food with a high fiber content: increase your fiber intake by consuming these types of foods:   Eat more whole grain products like barley, oats, farro, brown or wild rice and quinoa.    Look for choices with 100% whole wheat, rye, oats or bran as the first ingredient listed    Check nutrition fact labels and choose products with 4gm of dietary fiber or more per serving   Add more beans to your diet   Eat more fresh fruits and vegetables with skins on them    Exercise- increase physical activity as it helps stimulate gastric mobility, which moves stool through the digestive tract     Nutrition RX:  - start tracking intake  - focus on protein- goal is 30 grams per meal; 90 grams per day  - focus on increasing fiber first by increasing vegetable servings per day  - do not skip breakfast and goal water intake 64 oz daily    Physical Activity RX:  - Goal is 150-200 mins of activity weekly.  Try to include at least 2 strength training sessions; increasing lean body mass will really help you to lose weight and maintain weight loss.

## 2025-01-31 NOTE — LETTER
January 31, 2025     HARMAN Wilks  [address] [facility] [id number]    Patient: Maia Cortes   YOB: 1990   Date of Visit: 1/31/2025       Dear Dr. Castillo:    Thank you for referring Maia Cortes to me for evaluation. Below are the relevant portions of my assessment and plan of care.         If you have questions, please do not hesitate to call me. I look forward to following Maia along with you.         Sincerely,        Amarilis Montez PA-C        CC: No Recipients

## 2025-01-31 NOTE — PROGRESS NOTES
Assessment/Plan:  Maia was seen today for consult.    Diagnoses and all orders for this visit:    Obesity, Class III, BMI 40-49.9 (morbid obesity) (HCC)  -     Hemoglobin A1C; Future  -     Lipid panel; Future  -     Insulin, fasting; Future  -     TSH, 3rd generation with Free T4 reflex; Future    Morbid obesity (HCC)  -     Ambulatory Referral to Weight Management       No problem-specific Assessment & Plan notes found for this encounter.     - Discussed options of HealthyCORE-Intensive Lifestyle Intervention Program, Very Low Calorie Diet-VLCD, and Conservative Program and the role of weight loss medications.  - Explained the importance of making lifestyle changes first before starting anti-obesity medications.  - Patient should demonstrate lifestyle changes first before anti-obesity medication initiated.   - Patient is interested in pursuing Conservative Program  - Initial weight loss goal of 5-10% weight loss for improved health as studies have shown this is where we see the greatest impact on improving health and decreasing risk of obesity related conditions.  - Weight loss can improve patient's co-morbid conditions and/or prevent weight-related complications.  - Stop Bang 4/8  - Labs reviewed: As below.      General Recommendations:  Nutrition:  Eat breakfast daily.  Do not skip meals.     Food log (ie.) www.[email or web address], sparkpeople.com, loseit.com, calorieking.com, etc.    Practice mindful eating.  Be sure to set aside time to eat, eat slowly, and savor your food.    Hydration:    At least 64oz of water daily.  No sugar sweetened beverages.  No juice (eat the fruit instead).    Exercise:  Studies have shown that the ideal exercise goal is somewhere between 150 to 300 minutes of moderate intensity exercise a week.  Start with exercising 10 minutes every other day and gradually increase physical activity with a goal of at least 150 minutes of moderate intensity exercise a week, divided over at least 3  days a week.  An example of this would be exercising 30 minutes a day, 5 days a week.  Resistance training can increase muscle mass and increase our resting metabolic rate.   FULL BODY resistance training is recommended 2-3 times a week.  Do not do this on consecutive days to allow for muscle recovery.    Aim for a bare minimum 5000 steps, even on days you do not exercise.    Monitoring:   Weigh yourself daily.  If this causes undue stress, then just weigh yourself once a week.  Weigh yourself the same time of the day with the same amount of clothing on.  Preferably this should be done after waking up, before you eat, and with no clothing or minimal clothing on.    Specific Goals:  Gradually increase physical activity to a goal of 5 days per week for 30 minutes of MODERATE intensity PLUS 2 days per week of FULL BODY resistance training  Goal protein intake of 60-80 grams per day  5-10 servings of fruits and vegetables per day    Calorie goal:  [id number] (Provided with meal plan to follow).    Return visit:  2 months  Fasting labs- pending   2.  RX Zepbound 2.5mg x 4 weeks and then increase to 5mg once weekly. Emphasized the importance of adherence to the medication schedule and lifestyle modifications, including diet and exercise. Provided patient with written materials on Zepbound usage, dietary plans, and exercise recommendations. Discussed the importance of regular monitoring and follow up to ensure the safety and effectiveness of the treatment.  Education provided on side effects, how to monitor and when to see medical attention.  Patient received training on self-administration of the injection. Goal of treatment is to attain a weight loss of approximately 5-10% TBW within next 3-6 months.  Goal is to achieve weight loss to improve overall health and reduce risks associated with obesity and weight related comorbidities.    Nutrition RX:  - start tracking intake  - focus on protein- goal is 30 grams per meal; 90  grams per day  - focus on increasing fiber first by increasing vegetable servings per day  - do not skip breakfast and goal water intake 64 oz daily    Physical Activity RX:  - Goal is 150-200 mins of activity weekly.  Try to include at least 2 strength training sessions; increasing lean body mass will really help you to lose weight and maintain weight loss.      I have sent Zebound to your pharmacy. The prior authorization process will been done through our prior authorization team and can take up to 3-4 weeks to process through the insurance.     - Start Zepbound 2.5 mg subcutaneously weekly. After you have taken the second pen, please give me an update, as we will likely increase the dose the next month if you are tolerating it well.    - Side effects of Zepbound include nausea, vomiting, diarrhea, or constipation. Keep an eye on your heart rate while on Zepbound. If you resting heart rate is greater than 100 beats per minutes, please notify me. If you develop severe abdominal pain, stop Zepbound and go to the emergency room, as that could be a sign of pancreatitis.     - Please notify me if you have surgery, upper endoscopy, or colonoscopy scheduled, as we typically hold Zepbound for one week prior to the procedure.     - Zepbound can reduce the effectiveness of oral hormonal birth control (birth control pills). Recommend a barrier backup method such as condoms to prevent pregnancy.           AOM-   *patient is currently on bupropion  Patient denies personal and family history of  pancreatitis, thyroid cancer, MEN-2 tumors.  Denies any hx of glaucoma, seizures, kidney stones, gallstones.  Denies Hx of CAD, PAD, palpitations, arrhythmia.   Denies uncontrolled anxiety or depression, suicidal behavior or thinking , insomnia or sleep disturbance. *patient is seeing a therapist for her depression and reports being stable.     Total time spent reviewing chart, interviewing patient, examining patient, discussing plan,  "answering all questions, and documentin min.       ______________________________________________________________________        Subjective:   Chief Complaint   Patient presents with    Consult     MWM- Consult; GW-195lb; Waist-52in; Stop Bang-         Waist circumference: 52\"  Initial Weight: 289 lbs BMI 43.39  Goal Weight: 195 lbs       Family history-   T2DM paternal grandparents     Social History:  Lives on her own.     HPI: Maia Cortes  is a 34 y.o. female with history of fatigue and excess weight, here to pursue weight loss management.  Previous notes and records have been reviewed.    Patient reports that she has been gaining weight for several years but over the past 7-8 months it has accelerated and her knees are now bothering her and she feels uncomfortable.     Weight History:  In  patient states she was at a healthy weight. Over the past 7-8 yrs she has been struggling with her weight. 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 She also feels that depression has impacted her weight as well.  She just started taking seroquel about 1 year ago and has not noticed increased weight with this.     Patient states she is making healthy food choices and has been exercising for past 4 months at the gym but prior to that she was walking; has no car.     She is on her feet at work, works at a [employer].     HPI  Wt Readings from Last 20 Encounters:   25 131 kg (289 lb 9.6 oz)   24 133 kg (292 lb 14.4 oz)   12/15/23 120 kg (263 lb 14.3 oz)   20 90.7 kg (200 lb)   18 104 kg (230 lb)     Excess Weight:  Severity: severe  Onset:  past 7-8 yrs  Highest weight: 289 lbs    What has been tried: Diet and Exercise  Contributing factors: Poor Food Choices, Insufficient Physical Activity, Stress/Emotional Eating, Medications, and Depression  Associated symptoms and effects: comorbid conditions, fatigue, increased joint pain, and decreased exercise capacity    Food logging:  B: coffee, banana, " "yogurt, cheese and crackers, fruit (12-1pm)  S:  L: (6pm) protein, salad, vegetables -- occasionally will do rice   S: nuts, granola bar   D: (10:30-11pm) she sometimes will have a smoothie (frozen fruitl, honey)   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  Denies any hx of glaucoma, seizures, kidney stones, gallstones.  Denies Hx of CAD, PAD, palpitations, arrhythmia.   Denies uncontrolled anxiety or depression, suicidal behavior or thinking , insomnia or sleep disturbance.   Past Surgical History:   Procedure Laterality Date    INDUCED        The following portions of the patient's history were reviewed and updated as appropriate: allergies, current medications, past family history, past medical history, past social history, past surgical history, and problem list.    Review Of Systems:  Review of Systems    Objective:  /72 (BP Location: Right arm, Patient Position: Sitting)   Pulse 89   Temp (!) 96.5 °F (35.8 °C) (Tympanic)   Resp 16   Ht 5' 8.5\" (1.74 m)   Wt 131 kg (289 lb 9.6 oz)   BMI 43.39 kg/m²   Physical Exam    Labs and Imaging  Recent labs and imaging have been personally reviewed.  Lab Results   Component Value Date    WBC 10.90 (H) 12/15/2023    HGB 13.1 12/15/2023    HCT 40.2 12/15/2023    MCV 85 12/15/2023     (H) 12/15/2023     Lab Results   Component Value Date    " "SODIUM 139 12/15/2023    K 3.7 12/15/2023     12/15/2023    CO2 27 12/15/2023    AGAP 8 12/15/2023    BUN 15 12/15/2023    CREATININE 1.08 12/15/2023    GLUC 93 12/15/2023    CALCIUM 9.0 12/15/2023    AST 12 10/20/2023    ALT 16 10/20/2023    ALKPHOS 121 (H) 10/20/2023    TP 6.8 10/20/2023    TBILI 0.4 10/20/2023    EGFR 68 12/15/2023     Lab Results   Component Value Date    HGBA1C 5.5 10/20/2023     Lab Results   Component Value Date    ZAG5ASPSOCNE 2.102 11/17/2023    TSH 2.26 10/20/2023     No results found for: \"CHOLESTEROL\"  No results found for: \"HDL\"  No results found for: \"TRIG\"  No results found for: \"LDLCALC\"  "

## 2025-02-05 ENCOUNTER — TELEPHONE (OUTPATIENT)
Age: 35
End: 2025-02-05

## 2025-02-05 NOTE — TELEPHONE ENCOUNTER
PA for Zepbound SUBMITTED to [facility] [employer]    via    [x]CMM-KEY: [id number]  []Surescripts-Case ID #    []Availity-Auth ID #  NDC #    []Faxed to plan   []Other website    []Phone call Case ID #      []PA sent as URGENT    All office notes, labs and other pertaining documents and studies sent. Clinical questions answered. Awaiting determination from insurance company.     Turnaround time for your insurance to make a decision on your Prior Authorization can take 7-21 business days.

## 2025-02-06 ENCOUNTER — TELEPHONE (OUTPATIENT)
Dept: BARIATRICS | Facility: CLINIC | Age: 35
End: 2025-02-06

## 2025-02-06 DIAGNOSIS — E66.01 MORBID OBESITY (HCC): ICD-10-CM

## 2025-02-06 DIAGNOSIS — E66.01 OBESITY, CLASS III, BMI 40-49.9 (MORBID OBESITY) (HCC): Primary | ICD-10-CM

## 2025-02-06 RX ORDER — TIRZEPATIDE 2.5 MG/.5ML
2.5 INJECTION, SOLUTION SUBCUTANEOUS WEEKLY
Qty: 2 ML | Refills: 0 | Status: SHIPPED | OUTPATIENT
Start: 2025-02-06 | End: 2025-03-06

## 2025-02-06 NOTE — TELEPHONE ENCOUNTER
PA for Zepbound  APPROVED- [employer]    Date(s) approved 2/5/2025-8/5/2026    Case #    Patient advised by          []LyricFindhart Message  []Phone call   [x]LMOM  []L/M to call office as no active Communication consent on file  []Unable to leave detailed message as VM not approved on Communication consent       Pharmacy advised by    []Fax  [x]Phone call    Approval letter scanned into Media Yes      Extraction Method: comedo extractor Detail Level: Zone Acne Type: Comedonal Lesions Render Number Of Lesions Treated: no Consent was obtained and risks were reviewed including but not limited to scarring, infection, bleeding, scabbing, incomplete removal, and allergy to anesthesia. Post-Care Instructions: I reviewed with the patient in detail post-care instructions. Patient is to keep the treatment areas dry overnight, and then apply bacitracin twice daily until healed. Patient may apply hydrogen peroxide soaks to remove any crusting.

## 2025-02-06 NOTE — TELEPHONE ENCOUNTER
Patient states she received a call from the office that her PA for the Zepbound was approved. She would like the prescription sent over to  [id number] - [place] - [address] [place] [address]  [address]  [address]  [address], [place] [address]  Phone: [phone]  Fax: [phone]

## 2025-03-18 ENCOUNTER — TELEPHONE (OUTPATIENT)
Age: 35
End: 2025-03-18

## 2025-03-18 NOTE — TELEPHONE ENCOUNTER
Name of Medication: “What is the medication you are calling about?” Zepbound    Dosage: “What dose are you currently taking” 2.5mg    Question: “What is your question?” (Medication refill, side effect, reaction) refill    Prescribing HCP: “Who prescribed it?” TRUNG Haynes PA-C    Symptoms: “Do you have any symptoms?” After 3rd injection, PT had [employer] McFlurry and McNuggets and then experienced self-resolving abd pain & diarrhea. Denied pain radiating to back, vomiting, CP/SOB. Discussed with PT diet choices and to avoid fatty foods while on injections. PT verbalized understanding and agreeable to plan. PT reports readiness to increase dose.     PT notes that she has been trying to decrease amount of cigarettes she has and has noticed mild pleuritic CP. Advised PT to keep an eye on CP and update if it gets any worse. PT verbalized understanding and agreeable to plan.    Severity: If the symptoms are present are the mild, moderate, severe? Moderate-severe    Last visit weight: 289lbs (1/31/25)    Current weight: 289lbs (PT does not have scale)    Date of last injection: Friday    How many injections do you have left: 1

## 2025-03-20 DIAGNOSIS — E66.01 MORBID OBESITY (HCC): ICD-10-CM

## 2025-03-20 DIAGNOSIS — E66.01 OBESITY, CLASS III, BMI 40-49.9 (MORBID OBESITY) (HCC): Primary | ICD-10-CM

## 2025-03-20 RX ORDER — TIRZEPATIDE 5 MG/.5ML
5 INJECTION, SOLUTION SUBCUTANEOUS WEEKLY
Qty: 2 ML | Refills: 2 | Status: SHIPPED | OUTPATIENT
Start: 2025-03-20 | End: 2025-06-12

## 2025-04-01 ENCOUNTER — TELEPHONE (OUTPATIENT)
Age: 35
End: 2025-04-01

## 2025-04-01 NOTE — TELEPHONE ENCOUNTER
Pt called in and wanted to see if she can schedule an appt for testing of ADD ADHD.     I told her that [facility] does not test for this but I was able to provide her with a few locations to call.     I went to Transfer pt to [facility] to [place] to see if they would have any additional resources but the line disconnected.

## 2025-04-03 ENCOUNTER — TELEPHONE (OUTPATIENT)
Dept: OBGYN CLINIC | Facility: CLINIC | Age: 35
End: 2025-04-03

## 2025-04-03 NOTE — TELEPHONE ENCOUNTER
Called patient regarding NO SHOW on 4/3/25. Left voicemail for patient to call and reschedule.    Statement Selected

## 2025-04-11 ENCOUNTER — TELEPHONE (OUTPATIENT)
Age: 35
End: 2025-04-11

## 2025-04-16 ENCOUNTER — OFFICE VISIT (OUTPATIENT)
Age: 35
End: 2025-04-16
Payer: COMMERCIAL

## 2025-04-16 VITALS
RESPIRATION RATE: 16 BRPM | HEIGHT: 69 IN | BODY MASS INDEX: 39.93 KG/M2 | WEIGHT: 269.6 LBS | HEART RATE: 88 BPM | TEMPERATURE: 96.3 F | SYSTOLIC BLOOD PRESSURE: 120 MMHG | DIASTOLIC BLOOD PRESSURE: 70 MMHG

## 2025-04-16 DIAGNOSIS — E66.813 OBESITY, CLASS III, BMI 40-49.9 (MORBID OBESITY): Primary | ICD-10-CM

## 2025-04-16 PROCEDURE — 99214 OFFICE O/P EST MOD 30 MIN: CPT | Performed by: PHYSICIAN ASSISTANT

## 2025-04-16 RX ORDER — TIRZEPATIDE 7.5 MG/.5ML
7.5 INJECTION, SOLUTION SUBCUTANEOUS WEEKLY
Qty: 2 ML | Refills: 1 | Status: SHIPPED | OUTPATIENT
Start: 2025-04-16 | End: 2025-06-11

## 2025-04-16 NOTE — PROGRESS NOTES
Assessment/Plan:  Maia was seen today for follow-up.    Diagnoses and all orders for this visit:    Obesity, Class III, BMI 40-49.9 (morbid obesity) (HCC)         No problem-specific Assessment & Plan notes found for this encounter.     - Discussed options of HealthyCORE-Intensive Lifestyle Intervention Program, Very Low Calorie Diet-VLCD, and Conservative Program and the role of weight loss medications.  - Explained the importance of making lifestyle changes first before starting anti-obesity medications.  - Patient should demonstrate lifestyle changes first before anti-obesity medication initiated.   - Patient is interested in pursuing Conservative Program  - Initial weight loss goal of 5-10% weight loss for improved health as studies have shown this is where we see the greatest impact on improving health and decreasing risk of obesity related conditions.  - Weight loss can improve patient's co-morbid conditions and/or prevent weight-related complications.  - Stop Bang 4/8  - Labs reviewed: As below.      General Recommendations:  Nutrition:  Eat breakfast daily.  Do not skip meals.     Food log (ie.) www.Notehall.com, sparkpeople.com, loseit.com, calorieking.com, etc.    Practice mindful eating.  Be sure to set aside time to eat, eat slowly, and savor your food.    Hydration:    At least 64oz of water daily.  No sugar sweetened beverages.  No juice (eat the fruit instead).    Exercise:  Studies have shown that the ideal exercise goal is somewhere between 150 to 300 minutes of moderate intensity exercise a week.  Start with exercising 10 minutes every other day and gradually increase physical activity with a goal of at least 150 minutes of moderate intensity exercise a week, divided over at least 3 days a week.  An example of this would be exercising 30 minutes a day, 5 days a week.  Resistance training can increase muscle mass and increase our resting metabolic rate.   FULL BODY resistance training is  recommended 2-3 times a week.  Do not do this on consecutive days to allow for muscle recovery.    Aim for a bare minimum 5000 steps, even on days you do not exercise.    Monitoring:   Weigh yourself daily.  If this causes undue stress, then just weigh yourself once a week.  Weigh yourself the same time of the day with the same amount of clothing on.  Preferably this should be done after waking up, before you eat, and with no clothing or minimal clothing on.    Specific Goals:  Gradually increase physical activity to a goal of 5 days per week for 30 minutes of MODERATE intensity PLUS 2 days per week of FULL BODY resistance training  Goal protein intake of 60-80 grams per day  5-10 servings of fruits and vegetables per day    Calorie goal:  [id number] (Provided with meal plan to follow).    Return visit:  10 weeks    1) Increase Zepbound to 7.5mg once weekly     - Side effects of Zepbound include nausea, vomiting, diarrhea, or constipation. Keep an eye on your heart rate while on Zepbound. If you resting heart rate is greater than 100 beats per minutes, please notify me. If you develop severe abdominal pain, stop Zepbound and go to the emergency room, as that could be a sign of pancreatitis.     - Please notify me if you have surgery, upper endoscopy, or colonoscopy scheduled, as we typically hold Zepbound for one week prior to the procedure.     - Zepbound can reduce the effectiveness of oral hormonal birth control (birth control pills). Recommend a barrier backup method such as condoms to prevent pregnancy.         2)  Nutrition RX:  - start tracking intake  - focus on protein- goal is 30 grams per meal; 90 grams per day  - focus on increasing fiber first by increasing vegetable servings per day  - do not skip breakfast and goal water intake 64 oz daily    Physical Activity RX:  - Goal is 150-200 mins of activity weekly.  Try to include at least 2 strength training sessions; increasing lean body mass will really  "help you to lose weight and maintain weight loss.          AOM-   *patient is currently on bupropion  Patient denies personal and family history of  pancreatitis, thyroid cancer, MEN-2 tumors.  Denies any hx of glaucoma, seizures, kidney stones, gallstones.  Denies Hx of CAD, PAD, palpitations, arrhythmia.   Denies uncontrolled anxiety or depression, suicidal behavior or thinking , insomnia or sleep disturbance. *patient is seeing a therapist for her depression and reports being stable.     Total time spent reviewing chart, interviewing patient, examining patient, discussing plan, answering all questions, and documentin min.       ______________________________________________________________________        Subjective:   Chief Complaint   Patient presents with    Follow-up     MWM- 2 Mo F/u; Waist-49in         Waist circumference: 52\"  Current waist circumference: 49\"   Initial Weight: 289 lbs BMI 43.39  Current Weight 269 lbs BMI 40  Goal Weight: 195 lbs       Family history-   T2DM paternal grandparents     Social History:  Lives on her own.     HPI: Maia Cortes  is a 34 y.o. female with history of fatigue and excess weight, here to pursue weight loss management.  Previous notes and records have been reviewed.    Patient returns for follow up visit. She is doing well on Zepbound 5mg weekly.  She has had some diarrhea if she eats the wrong thing which does not happen often.  She does report some break through hunger towards the end of the week.      Physical Activity:  Going to the gym 2-3 x per week; trying to go more but she does not have a car     Nutrition  B- protein shake  Greek yogurt with fruit  L- tostadas ground turkey or chicken, vegetables and fruit   Air fryer with protein (shrimp, talapia)   Has cut out sweets, fast food completely cut out, no processed foods         Patient reports that she has been gaining weight for several years but over the past 7-8 months it has accelerated and her knees " are now bothering her and she feels uncomfortable.     Weight History:  In 2015 patient states she was at a healthy weight. Over the past 7-8 yrs she has been struggling with her weight. 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 She also feels that depression has impacted her weight as well.  She just started taking seroquel about 1 year ago and has not noticed increased weight with this.     Patient states she is making healthy food choices and has been exercising for past 4 months at the gym but prior to that she was walking; has no car.     She is on her feet at work, works at a [employer].     HPI  Wt Readings from Last 20 Encounters:   04/16/25 122 kg (269 lb 9.6 oz)   01/31/25 131 kg (289 lb 9.6 oz)   11/19/24 133 kg (292 lb 14.4 oz)   12/15/23 120 kg (263 lb 14.3 oz)   12/05/20 90.7 kg (200 lb)   11/26/18 104 kg (230 lb)     Excess Weight:  Severity: severe  Onset:  past 7-8 yrs  Highest weight: 289 lbs    What has been tried: Diet and Exercise  Contributing factors: Poor Food Choices, Insufficient Physical Activity, Stress/Emotional Eating, Medications, and Depression  Associated symptoms and effects: comorbid conditions, fatigue, increased joint pain, and decreased exercise capacity    Food logging:  B: coffee, banana, yogurt, cheese and crackers, fruit (12-1pm)  S:  L: (6pm) protein, salad, vegetables -- occasionally will do rice   S: nuts, granola bar   D: (10:30-11pm) she sometimes will have a smoothie (frozen fruitl, honey)   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  Denies any hx of glaucoma, seizures, kidney stones, gallstones.  Denies Hx of CAD, PAD, palpitations, arrhythmia.   Denies uncontrolled anxiety or depression, suicidal behavior or thinking , insomnia or sleep disturbance.   Past Surgical History:   Procedure Laterality Date    INDUCED        The following portions of the patient's history were reviewed and updated as appropriate: allergies, current medications, past family history, past medical history, past social history, past surgical history, and problem list.    Review Of Systems:  Review of Systems    Objective:  /70 (BP Location: Left arm, Patient Position: Sitting)   Pulse 88   Temp (!) 96.3 °F (35.7 °C) (Tympanic)   Resp 16   Ht 5' 8.5\" (1.74 m)   Wt 122 kg (269 lb 9.6 oz)   BMI 40.40 kg/m²   Physical Exam    Labs and Imaging  Recent labs and imaging have been personally reviewed.  Lab Results   Component Value Date    WBC 10.90 (H) 12/15/2023    HGB 13.1 12/15/2023    HCT 40.2 12/15/2023    MCV 85 12/15/2023     (H) 12/15/2023     Lab Results   Component Value Date    SODIUM 139 12/15/2023    K 3.7 12/15/2023     12/15/2023    CO2 27 12/15/2023    AGAP 8 12/15/2023    BUN 15 12/15/2023    CREATININE 1.08 12/15/2023    GLUC 93 12/15/2023    CALCIUM 9.0 12/15/2023    AST 12 10/20/2023    ALT 16 10/20/2023    ALKPHOS 121 (H) 10/20/2023    TP 6.8 10/20/2023    TBILI 0.4 10/20/2023    EGFR 68 12/15/2023     Lab Results   Component Value Date    HGBA1C 5.5 10/20/2023     Lab Results   Component Value Date    CKK0OMHIBAWG 2.102 2023    TSH 2.26 10/20/2023     No results found for: \"CHOLESTEROL\"  No results found for: \"HDL\"  No results found for: \"TRIG\"  No results found for: \"LDLCALC\"  "

## 2025-05-12 ENCOUNTER — TELEPHONE (OUTPATIENT)
Age: 35
End: 2025-05-12

## 2025-05-12 NOTE — TELEPHONE ENCOUNTER
"FOLLOW UP: Please advise. Call back # [phone]    REASON FOR CONVERSATION: No Triage Call    SYMPTOMS: Pt asking for dicyclomine script specifically. Pt reports generalized stomach cramps and diarrhea immediately following eating x several days occasionally.       OTHER: Pt reports this has occurred in the past and dicyclomine has helped symptoms.   Educated on \"healthy eatings tips sheet.\"     DISPOSITION: Discuss with Provider and Call Back Patient    "

## 2025-06-17 ENCOUNTER — APPOINTMENT (OUTPATIENT)
Dept: LAB | Facility: CLINIC | Age: 35
End: 2025-06-17
Payer: COMMERCIAL

## 2025-06-17 ENCOUNTER — HOSPITAL ENCOUNTER (OUTPATIENT)
Dept: RADIOLOGY | Facility: HOSPITAL | Age: 35
Discharge: HOME | End: 2025-06-17
Payer: COMMERCIAL

## 2025-06-17 ENCOUNTER — OFFICE VISIT (OUTPATIENT)
Dept: FAMILY MEDICINE CLINIC | Facility: CLINIC | Age: 35
End: 2025-06-17

## 2025-06-17 VITALS
OXYGEN SATURATION: 99 % | BODY MASS INDEX: 36.58 KG/M2 | WEIGHT: 247 LBS | DIASTOLIC BLOOD PRESSURE: 62 MMHG | SYSTOLIC BLOOD PRESSURE: 108 MMHG | RESPIRATION RATE: 16 BRPM | TEMPERATURE: 96.4 F | HEART RATE: 87 BPM | HEIGHT: 69 IN

## 2025-06-17 DIAGNOSIS — G89.29 CHRONIC PAIN OF RIGHT KNEE: ICD-10-CM

## 2025-06-17 DIAGNOSIS — M54.41 CHRONIC BILATERAL LOW BACK PAIN WITH RIGHT-SIDED SCIATICA: ICD-10-CM

## 2025-06-17 DIAGNOSIS — E66.813 OBESITY, CLASS III, BMI 40-49.9 (MORBID OBESITY): ICD-10-CM

## 2025-06-17 DIAGNOSIS — E66.01 MORBID OBESITY (HCC): ICD-10-CM

## 2025-06-17 DIAGNOSIS — M25.561 CHRONIC PAIN OF RIGHT KNEE: ICD-10-CM

## 2025-06-17 DIAGNOSIS — Z00.00 ANNUAL PHYSICAL EXAM: Primary | ICD-10-CM

## 2025-06-17 DIAGNOSIS — G89.29 CHRONIC BILATERAL LOW BACK PAIN WITH RIGHT-SIDED SCIATICA: ICD-10-CM

## 2025-06-17 DIAGNOSIS — Z11.4 SCREENING FOR HIV (HUMAN IMMUNODEFICIENCY VIRUS): ICD-10-CM

## 2025-06-17 DIAGNOSIS — E55.9 VITAMIN D DEFICIENCY: ICD-10-CM

## 2025-06-17 DIAGNOSIS — Z11.59 NEED FOR HEPATITIS C SCREENING TEST: ICD-10-CM

## 2025-06-17 LAB
25(OH)D3 SERPL-MCNC: 20.2 NG/ML (ref 30–100)
ALBUMIN SERPL BCG-MCNC: 3.9 G/DL (ref 3.5–5)
ALP SERPL-CCNC: 101 U/L (ref 34–104)
ALT SERPL W P-5'-P-CCNC: 11 U/L (ref 7–52)
ANION GAP SERPL CALCULATED.3IONS-SCNC: 6 MMOL/L (ref 4–13)
AST SERPL W P-5'-P-CCNC: 14 U/L (ref 13–39)
BASOPHILS # BLD AUTO: 0.03 THOUSANDS/ÂΜL (ref 0–0.1)
BASOPHILS NFR BLD AUTO: 0 % (ref 0–1)
BILIRUB SERPL-MCNC: 0.3 MG/DL (ref 0.2–1)
BUN SERPL-MCNC: 12 MG/DL (ref 5–25)
CALCIUM SERPL-MCNC: 8.7 MG/DL (ref 8.4–10.2)
CHLORIDE SERPL-SCNC: 105 MMOL/L (ref 96–108)
CO2 SERPL-SCNC: 24 MMOL/L (ref 21–32)
CREAT SERPL-MCNC: 0.66 MG/DL (ref 0.6–1.3)
EOSINOPHIL # BLD AUTO: 0.4 THOUSAND/ÂΜL (ref 0–0.61)
EOSINOPHIL NFR BLD AUTO: 4 % (ref 0–6)
ERYTHROCYTE [DISTWIDTH] IN BLOOD BY AUTOMATED COUNT: 13.2 % (ref 11.6–15.1)
GFR SERPL CREATININE-BSD FRML MDRD: 117 ML/MIN/1.73SQ M
GLUCOSE SERPL-MCNC: 92 MG/DL (ref 65–140)
HCT VFR BLD AUTO: 40.9 % (ref 34.8–46.1)
HGB BLD-MCNC: 13.5 G/DL (ref 11.5–15.4)
IMM GRANULOCYTES # BLD AUTO: 0.04 THOUSAND/UL (ref 0–0.2)
IMM GRANULOCYTES NFR BLD AUTO: 0 % (ref 0–2)
LYMPHOCYTES # BLD AUTO: 2.88 THOUSANDS/ÂΜL (ref 0.6–4.47)
LYMPHOCYTES NFR BLD AUTO: 25 % (ref 14–44)
MCH RBC QN AUTO: 28.7 PG (ref 26.8–34.3)
MCHC RBC AUTO-ENTMCNC: 33 G/DL (ref 31.4–37.4)
MCV RBC AUTO: 87 FL (ref 82–98)
MONOCYTES # BLD AUTO: 0.64 THOUSAND/ÂΜL (ref 0.17–1.22)
MONOCYTES NFR BLD AUTO: 6 % (ref 4–12)
NEUTROPHILS # BLD AUTO: 7.59 THOUSANDS/ÂΜL (ref 1.85–7.62)
NEUTS SEG NFR BLD AUTO: 65 % (ref 43–75)
NRBC BLD AUTO-RTO: 0 /100 WBCS
PLATELET # BLD AUTO: 396 THOUSANDS/UL (ref 149–390)
PMV BLD AUTO: 10.3 FL (ref 8.9–12.7)
POTASSIUM SERPL-SCNC: 4.1 MMOL/L (ref 3.5–5.3)
PROT SERPL-MCNC: 6.9 G/DL (ref 6.4–8.4)
RBC # BLD AUTO: 4.7 MILLION/UL (ref 3.81–5.12)
SODIUM SERPL-SCNC: 135 MMOL/L (ref 135–147)
WBC # BLD AUTO: 11.58 THOUSAND/UL (ref 4.31–10.16)

## 2025-06-17 PROCEDURE — 85025 COMPLETE CBC W/AUTO DIFF WBC: CPT

## 2025-06-17 PROCEDURE — 80053 COMPREHEN METABOLIC PANEL: CPT

## 2025-06-17 PROCEDURE — 99395 PREV VISIT EST AGE 18-39: CPT

## 2025-06-17 PROCEDURE — 73562 X-RAY EXAM OF KNEE 3: CPT

## 2025-06-17 PROCEDURE — 87389 HIV-1 AG W/HIV-1&-2 AB AG IA: CPT

## 2025-06-17 PROCEDURE — 82306 VITAMIN D 25 HYDROXY: CPT

## 2025-06-17 PROCEDURE — 99213 OFFICE O/P EST LOW 20 MIN: CPT

## 2025-06-17 PROCEDURE — 36415 COLL VENOUS BLD VENIPUNCTURE: CPT

## 2025-06-17 PROCEDURE — 86803 HEPATITIS C AB TEST: CPT

## 2025-06-17 RX ORDER — ONDANSETRON 4 MG/1
4 TABLET, ORALLY DISINTEGRATING ORAL EVERY 8 HOURS PRN
COMMUNITY
Start: 2025-04-28

## 2025-06-17 RX ORDER — BUPROPION HYDROCHLORIDE 150 MG/1
TABLET ORAL
COMMUNITY
Start: 2025-04-25

## 2025-06-17 RX ORDER — HYDROCORTISONE 25 MG/G
CREAM TOPICAL
COMMUNITY
Start: 2025-06-03

## 2025-06-17 RX ORDER — TIRZEPATIDE 10 MG/.5ML
10 INJECTION, SOLUTION SUBCUTANEOUS WEEKLY
COMMUNITY
End: 2025-06-18 | Stop reason: SDUPTHER

## 2025-06-17 RX ORDER — DICYCLOMINE HCL 20 MG
20 TABLET ORAL EVERY 8 HOURS
COMMUNITY
Start: 2025-04-28

## 2025-06-17 NOTE — PROGRESS NOTES
Adult Annual Physical  Name: Maia Cortes      : 1990      MRN: [id number]  Encounter Provider: HARMAN Wilks  Encounter Date: 2025   Encounter department: [facility]    :  Assessment & Plan  Annual physical exam         Chronic bilateral low back pain with right-sided sciatica    Orders:    Ambulatory Referral to Comprehensive Spine Program; Future    Ambulatory Referral to Chiropractic; Future    Chronic pain of right knee    Orders:    Ambulatory Referral to Chiropractic; Future        Preventive Screenings:    - Lung cancer screening: screening not indicated          History of Present Illness     Adult Annual Physical:  Patient presents for annual physical.     Diet and Physical Activity:  - Diet/Nutrition: well balanced diet, adequate fiber intake and consuming 3-5 servings of fruits/vegetables daily.  - Exercise: walking, more than 2 hours on average and 5-7 times a week on average.    Depression Screening:    - PHQ-9 Score: 0    General Health:  - Sleep: sleeps well, 7-8 hours of sleep on average and unrefreshing sleep.  - Hearing: normal hearing bilateral ears.  - Vision: vision problems, wears glasses and most recent eye exam > 1 year ago.  - Dental: regular dental visits, brushes teeth three times daily and floss regularly.    /GYN Health:  - Follows with GYN: yes.   - Last menstrual cycle: 2025.   - History of STDs: yes    Review of Systems   Constitutional:  Negative for chills and fever.   HENT:  Negative for ear pain and sore throat.    Eyes:  Negative for pain and visual disturbance.   Respiratory:  Negative for cough and shortness of breath.    Cardiovascular:  Negative for chest pain and palpitations.   Gastrointestinal:  Negative for abdominal pain and vomiting.   Genitourinary:  Negative for dysuria and hematuria.   Musculoskeletal:  Positive for arthralgias, back pain and myalgias.   Skin:  Negative for color change and rash.  "  Neurological:  Negative for seizures and syncope.   All other systems reviewed and are negative.        Objective   /62 (BP Location: Left arm, Patient Position: Sitting, Cuff Size: Large)   Pulse 87   Temp (!) 96.4 °F (35.8 °C) (Temporal)   Resp 16   Ht 5' 8.5\" (1.74 m)   Wt 112 kg (247 lb)   LMP 06/01/2025 (Approximate)   SpO2 99%   BMI 37.01 kg/m²     Physical Exam  Vitals and nursing note reviewed.   Constitutional:       General: She is not in acute distress.     Appearance: Normal appearance. She is well-developed.   HENT:      Head: Normocephalic and atraumatic.      Right Ear: External ear normal.      Left Ear: External ear normal.      Nose: Nose normal.     Eyes:      Conjunctiva/sclera: Conjunctivae normal.       Cardiovascular:      Rate and Rhythm: Normal rate and regular rhythm.      Pulses: Normal pulses.      Heart sounds: Normal heart sounds. No murmur heard.  Pulmonary:      Effort: Pulmonary effort is normal. No respiratory distress.      Breath sounds: Normal breath sounds.   Abdominal:      Palpations: Abdomen is soft.      Tenderness: There is no abdominal tenderness.     Musculoskeletal:      Cervical back: Normal range of motion and neck supple.      Lumbar back: Tenderness present. Positive right straight leg raise test.     Skin:     General: Skin is warm and dry.     Neurological:      Mental Status: She is alert and oriented to person, place, and time. Mental status is at baseline.     Psychiatric:         Mood and Affect: Mood normal.         Behavior: Behavior normal.         Thought Content: Thought content normal.         Judgment: Judgment normal.         "

## 2025-06-17 NOTE — PATIENT INSTRUCTIONS
"PT: [phone]: Chiropractor  Routine physical for adults   The Basics   Written by the doctors and editors at [facility]   What is a physical? -- A physical is a routine visit, or \"check-up,\" with your doctor. You might also hear it called a \"wellness visit\" or \"preventive visit.\"  During each visit, the doctor will:   Ask about your physical and mental health   Ask about your habits, behaviors, and lifestyle   Do an exam   Give you vaccines if needed   Talk to you about any medicines you take   Give advice about your health   Answer your questions  Getting regular check-ups is an important part of taking care of your health. It can help your doctor find and treat any problems you have. But it's also important for preventing health problems.  A routine physical is different from a \"sick visit.\" A sick visit is when you see a doctor because of a health concern or problem. Since physicals are scheduled ahead of time, you can think about what you want to ask the doctor.  How often should I get a physical? -- It depends on your age and health. In general, for people age 21 years and older:   If you are younger than 50 years, you might be able to get a physical every 3 years.   If you are 50 years or older, your doctor might recommend a physical every year.  If you have an ongoing health condition, like diabetes or high blood pressure, your doctor will probably want to see you more often.  What happens during a physical? -- In general, each visit will include:   Physical exam - The doctor or nurse will check your height, weight, heart rate, and blood pressure. They will also look at your eyes and ears. They will ask about how you are feeling and whether you have any symptoms that bother you.   Medicines - It's a good idea to bring a list of all the medicines you take to each doctor visit. Your doctor will talk to you about your medicines and answer any questions. Tell them if you are having any side " "effects that bother you. You should also tell them if you are having trouble paying for any of your medicines.   Habits and behaviors - This includes:   Your diet   Your exercise habits   Whether you smoke, drink alcohol, or use drugs   Whether you are sexually active   Whether you feel safe at home  Your doctor will talk to you about things you can do to improve your health and lower your risk of health problems. They will also offer help and support. For example, if you want to quit smoking, they can give you advice and might prescribe medicines. If you want to improve your diet or get more physical activity, they can help you with this, too.   Lab tests, if needed - The tests you get will depend on your age and situation. For example, your doctor might want to check your:   Cholesterol   Blood sugar   Iron level   Vaccines - The recommended vaccines will depend on your age, health, and what vaccines you already had. Vaccines are very important because they can prevent certain serious or deadly infections.   Discussion of screening - \"Screening\" means checking for diseases or other health problems before they cause symptoms. Your doctor can recommend screening based on your age, risk, and preferences. This might include tests to check for:   Cancer, such as breast, prostate, cervical, ovarian, colorectal, prostate, lung, or skin cancer   Sexually transmitted infections, such as chlamydia and gonorrhea   Mental health conditions like depression and anxiety  Your doctor will talk to you about the different types of screening tests. They can help you decide which screenings to have. They can also explain what the results might mean.   Answering questions - The physical is a good time to ask the doctor or nurse questions about your health. If needed, they can refer you to other doctors or specialists, too.  Adults older than 65 years often need other care, too. As you get older, your doctor will talk to you about:   " How to prevent falling at home   Hearing or vision tests   Memory testing   How to take your medicines safely   Making sure that you have the help and support you need at home  All topics are updated as new evidence becomes available and our peer review process is complete.  This topic retrieved from [employer] on: May 02, 2024.  Topic 417651 Version 1.0  Release: 32.4.3 - C32.122  © 2024 UpToDate, Inc. and/or its affiliates. All rights reserved.  Consumer Information Use and Disclaimer   Disclaimer: This generalized information is a limited summary of diagnosis, treatment, and/or medication information. It is not meant to be comprehensive and should be used as a tool to help the user understand and/or assess potential diagnostic and treatment options. It does NOT include all information about conditions, treatments, medications, side effects, or risks that may apply to a specific patient. It is not intended to be medical advice or a substitute for the medical advice, diagnosis, or treatment of a health care provider based on the health care provider's examination and assessment of a patient's specific and unique circumstances. Patients must speak with a health care provider for complete information about their health, medical questions, and treatment options, including any risks or benefits regarding use of medications. This information does not endorse any treatments or medications as safe, effective, or approved for treating a specific patient. UpToDate, Inc. and its affiliates disclaim any warranty or liability relating to this information or the use thereof.The use of this information is governed by the Terms of Use, available at https://www.wolterskluwer.com/en/know/clinical-effectiveness-terms. 2024© UpToDate, Inc. and its affiliates and/or licensors. All rights reserved.  Copyright   © 2024 UpToDate, Inc. and/or its affiliates. All rights reserved.

## 2025-06-18 ENCOUNTER — OFFICE VISIT (OUTPATIENT)
Age: 35
End: 2025-06-18
Payer: COMMERCIAL

## 2025-06-18 VITALS
BODY MASS INDEX: 36.38 KG/M2 | DIASTOLIC BLOOD PRESSURE: 70 MMHG | SYSTOLIC BLOOD PRESSURE: 110 MMHG | TEMPERATURE: 97.6 F | WEIGHT: 245.6 LBS | RESPIRATION RATE: 16 BRPM | HEART RATE: 93 BPM | HEIGHT: 69 IN

## 2025-06-18 DIAGNOSIS — E66.813 OBESITY, CLASS III, BMI 40-49.9 (MORBID OBESITY): Primary | ICD-10-CM

## 2025-06-18 DIAGNOSIS — M54.41 CHRONIC BILATERAL LOW BACK PAIN WITH RIGHT-SIDED SCIATICA: Primary | ICD-10-CM

## 2025-06-18 DIAGNOSIS — G89.29 CHRONIC BILATERAL LOW BACK PAIN WITH RIGHT-SIDED SCIATICA: Primary | ICD-10-CM

## 2025-06-18 LAB
HCV AB SER QL: NORMAL
HIV 1+2 AB+HIV1 P24 AG SERPL QL IA: NORMAL

## 2025-06-18 PROCEDURE — 99214 OFFICE O/P EST MOD 30 MIN: CPT | Performed by: PHYSICIAN ASSISTANT

## 2025-06-18 RX ORDER — TIRZEPATIDE 7.5 MG/.5ML
7.5 INJECTION, SOLUTION SUBCUTANEOUS WEEKLY
COMMUNITY
End: 2025-06-18 | Stop reason: ALTCHOICE

## 2025-06-18 RX ORDER — TIRZEPATIDE 10 MG/.5ML
10 INJECTION, SOLUTION SUBCUTANEOUS WEEKLY
Qty: 2 ML | Refills: 3 | Status: SHIPPED | OUTPATIENT
Start: 2025-06-18 | End: 2025-06-20 | Stop reason: SDUPTHER

## 2025-06-18 NOTE — PROGRESS NOTES
Assessment/Plan:  Maia was seen today for follow-up.    Diagnoses and all orders for this visit:    Obesity, Class III, BMI 40-49.9 (morbid obesity)           No problem-specific Assessment & Plan notes found for this encounter.     - Discussed options of HealthyCORE-Intensive Lifestyle Intervention Program, Very Low Calorie Diet-VLCD, and Conservative Program and the role of weight loss medications.  - Explained the importance of making lifestyle changes first before starting anti-obesity medications.  - Patient should demonstrate lifestyle changes first before anti-obesity medication initiated.   - Patient is interested in pursuing Conservative Program  - Initial weight loss goal of 5-10% weight loss for improved health as studies have shown this is where we see the greatest impact on improving health and decreasing risk of obesity related conditions.  - Weight loss can improve patient's co-morbid conditions and/or prevent weight-related complications.  - Stop Bang 4/8  - Labs reviewed: As below.      General Recommendations:  Nutrition:  Eat breakfast daily.  Do not skip meals.     Food log (ie.) www.OmniPV.com, sparkpeople.com, loseit.com, calorieking.com, etc.    Practice mindful eating.  Be sure to set aside time to eat, eat slowly, and savor your food.    Hydration:    At least 64oz of water daily.  No sugar sweetened beverages.  No juice (eat the fruit instead).    Exercise:  Studies have shown that the ideal exercise goal is somewhere between 150 to 300 minutes of moderate intensity exercise a week.  Start with exercising 10 minutes every other day and gradually increase physical activity with a goal of at least 150 minutes of moderate intensity exercise a week, divided over at least 3 days a week.  An example of this would be exercising 30 minutes a day, 5 days a week.  Resistance training can increase muscle mass and increase our resting metabolic rate.   FULL BODY resistance training is  recommended 2-3 times a week.  Do not do this on consecutive days to allow for muscle recovery.    Aim for a bare minimum 5000 steps, even on days you do not exercise.    Monitoring:   Weigh yourself daily.  If this causes undue stress, then just weigh yourself once a week.  Weigh yourself the same time of the day with the same amount of clothing on.  Preferably this should be done after waking up, before you eat, and with no clothing or minimal clothing on.    Specific Goals:  Gradually increase physical activity to a goal of 5 days per week for 30 minutes of MODERATE intensity PLUS 2 days per week of FULL BODY resistance training  Goal protein intake of 60-80 grams per day  5-10 servings of fruits and vegetables per day    Calorie goal:  [id number] (Provided with meal plan to follow).    Return visit:  4 months     1) Increase Zepbound to 10mg once weekly     - Side effects of Zepbound include nausea, vomiting, diarrhea, or constipation. Keep an eye on your heart rate while on Zepbound. If you resting heart rate is greater than 100 beats per minutes, please notify me. If you develop severe abdominal pain, stop Zepbound and go to the emergency room, as that could be a sign of pancreatitis.     - Please notify me if you have surgery, upper endoscopy, or colonoscopy scheduled, as we typically hold Zepbound for one week prior to the procedure.     - Zepbound can reduce the effectiveness of oral hormonal birth control (birth control pills). Recommend a barrier backup method such as condoms to prevent pregnancy.       Discussed bowel regiment today- patient will continue on her regimen and will let us know if it starts to become an issue again  2)  Nutrition RX:  - start tracking intake  - focus on protein- goal is 30 grams per meal; 90 grams per day  - focus on increasing fiber first by increasing vegetable servings per day  - do not skip breakfast and goal water intake 64 oz daily    Physical Activity RX:  - discussed  "strength training   - Goal is 150-200 mins of activity weekly.  Try to include at least 2 strength training sessions; increasing lean body mass will really help you to lose weight and maintain weight loss.          AOM-   *patient is currently on bupropion  Patient denies personal and family history of  pancreatitis, thyroid cancer, MEN-2 tumors.  Denies any hx of glaucoma, seizures, kidney stones, gallstones.  Denies Hx of CAD, PAD, palpitations, arrhythmia.   Denies uncontrolled anxiety or depression, suicidal behavior or thinking , insomnia or sleep disturbance. *patient is seeing a therapist for her depression and reports being stable.     Total time spent reviewing chart, interviewing patient, examining patient, discussing plan, answering all questions, and documentin min.       ______________________________________________________________________        Subjective:   Chief Complaint   Patient presents with    Follow-up     MWM- 10 wk F/u; Waist-45in         Waist circumference: 52\"  Current waist circumference: 49\"   Initial Weight: 289 lbs BMI 43.39  Current Weight 245 lbs BMI 36  Goal Weight: 195 lbs       Family history-   T2DM paternal grandparents     Social History:  Lives on her own.     HPI: Maia Cortes  is a 34 y.o. female with history of fatigue and excess weight, here to pursue weight loss management.  Previous notes and records have been reviewed.    Patient returns for follow up visit. She is doing well on Zepbound 7.5mg weekly.  She has had some diarrhea if she eats the wrong thing which does not happen often.  She does report some break through hunger towards the end of the week.      Patient returns for follow up. She feels much better overall. She does feel a little on the tired side. We reviewed labs today. She did have to go to there ER for constipation but now this has not been an issue since she is taking miralax, fiber and increasing fiber and water in her diet.     Physical " Activity:  She is walking and lifting a lot at work, she knows she needs to be more active.   Going to the gym 2-3 x per week; trying to go more but she does not have a car     Nutrition  B- protein shake, yogurt, fruit  Greek yogurt with fruit  L- tostadas ground turkey or chicken, vegetables and fruit, go go squeeze apple sauce veggie, nuts, cheese  D- chicken, ground turkey,  Air fryer with protein (shrimp, talapia)   Has cut out sweets, fast food completely cut out, no processed foods   S- italian ice occasionally     Water- >80 oz     Weight History:     Patient reports that she has been gaining weight for several years but over the past 7-8 months it has accelerated and her knees are now bothering her and she feels uncomfortable.     Weight History:  In 2015 patient states she was at a healthy weight. Over the past 7-8 yrs she has been struggling with her weight. 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 She also feels that depression has impacted her weight as well.  She just started taking seroquel about 1 year ago and has not noticed increased weight with this.     Patient states she is making healthy food choices and has been exercising for past 4 months at the gym but prior to that she was walking; has no car.     She is on her feet at work, works at a [employer].     HPI  Wt Readings from Last 20 Encounters:   06/18/25 111 kg (245 lb 9.6 oz)   06/17/25 112 kg (247 lb)   04/16/25 122 kg (269 lb 9.6 oz)   01/31/25 131 kg (289 lb 9.6 oz)   11/19/24 133 kg (292 lb 14.4 oz)   12/15/23 120 kg (263 lb 14.3 oz)   12/05/20 90.7 kg (200 lb)   11/26/18 104 kg (230 lb)     Excess Weight:  Severity: severe  Onset:  past 7-8 yrs  Highest weight: 289 lbs    What has been tried: Diet and Exercise  Contributing factors: Poor Food Choices, Insufficient Physical Activity, Stress/Emotional Eating, Medications, and Depression  Associated symptoms and effects: comorbid conditions, fatigue, increased joint pain, and decreased  "exercise capacity    Food logging:  B: coffee, banana, yogurt, cheese and crackers, fruit (12-1pm)  S:  L: (6pm) protein, salad, vegetables -- occasionally will do rice   S: nuts, granola bar   D: (10:30-11pm) she sometimes will have a smoothie (frozen fruitl, honey)   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  Denies any hx of glaucoma, seizures, kidney stones, gallstones.  Denies Hx of CAD, PAD, palpitations, arrhythmia.   Denies uncontrolled anxiety or depression, suicidal behavior or thinking , insomnia or sleep disturbance.   Past Surgical History:   Procedure Laterality Date    INDUCED        The following portions of the patient's history were reviewed and updated as appropriate: allergies, current medications, past family history, past medical history, past social history, past surgical history, and problem list.    Review Of Systems:  Review of Systems    Objective:  /70 (BP Location: Left arm, Patient Position: Sitting)   Pulse 93   Temp 97.6 °F (36.4 °C) (Tympanic)   Resp 16   Ht 5' 8.5\" (1.74 m)   Wt 111 kg (245 lb 9.6 oz)   LMP 2025 (Approximate)   BMI 36.80 kg/m²   Physical Exam    Labs and Imaging  Recent labs and imaging have been personally reviewed.  Lab Results   Component Value Date    WBC 11.58 (H) 2025    HGB 13.5 2025    HCT 40.9 2025    MCV " "87 06/17/2025     (H) 06/17/2025     Lab Results   Component Value Date    SODIUM 135 06/17/2025    K 4.1 06/17/2025     06/17/2025    CO2 24 06/17/2025    AGAP 6 06/17/2025    BUN 12 06/17/2025    CREATININE 0.66 06/17/2025    GLUC 92 06/17/2025    CALCIUM 8.7 06/17/2025    AST 14 06/17/2025    ALT 11 06/17/2025    ALKPHOS 101 06/17/2025    TP 6.9 06/17/2025    TBILI 0.30 06/17/2025    EGFR 117 06/17/2025     Lab Results   Component Value Date    HGBA1C 5.5 10/20/2023     Lab Results   Component Value Date    CIW0VWFLDTMS 2.102 11/17/2023    TSH 2.26 10/20/2023     No results found for: \"CHOLESTEROL\"  No results found for: \"HDL\"  No results found for: \"TRIG\"  No results found for: \"LDLCALC\"  "

## 2025-06-20 DIAGNOSIS — E66.813 OBESITY, CLASS III, BMI 40-49.9 (MORBID OBESITY): ICD-10-CM

## 2025-06-20 RX ORDER — TIRZEPATIDE 10 MG/.5ML
10 INJECTION, SOLUTION SUBCUTANEOUS WEEKLY
Qty: 6 ML | Refills: 0 | Status: SHIPPED | OUTPATIENT
Start: 2025-06-20 | End: 2025-06-20 | Stop reason: SDUPTHER

## 2025-06-20 RX ORDER — TIRZEPATIDE 10 MG/.5ML
10 INJECTION, SOLUTION SUBCUTANEOUS WEEKLY
Qty: 6 ML | Refills: 0 | Status: SHIPPED | OUTPATIENT
Start: 2025-06-20 | End: 2025-06-24 | Stop reason: SDUPTHER

## 2025-06-20 NOTE — TELEPHONE ENCOUNTER
NOT A DUPLICATE!!!  Sent to wrong pharmacy also patient would like 3 month supply  Reason for call:   [x] Refill   [] Prior Auth  [] Other:     Office:   [] PCP/Provider -   [x] Specialty/Provider - /I[facility]    Medication: Zepbound 10mg    Dose/Frequency: inject weekly    Quantity: 6ml    Pharmacy: [facility]/pharmacy #[id number] - [place] - [address] [phone]     Local Pharmacy   Does the patient have enough for 3 days?   [x] Yes   [] No - Send as HP to POD

## 2025-06-24 DIAGNOSIS — E66.813 OBESITY, CLASS III, BMI 40-49.9 (MORBID OBESITY): ICD-10-CM

## 2025-06-24 RX ORDER — TIRZEPATIDE 10 MG/.5ML
10 INJECTION, SOLUTION SUBCUTANEOUS WEEKLY
Qty: 2 ML | Refills: 2 | Status: SHIPPED | OUTPATIENT
Start: 2025-06-24

## 2025-07-02 ENCOUNTER — NURSE TRIAGE (OUTPATIENT)
Age: 35
End: 2025-07-02

## 2025-07-02 NOTE — TELEPHONE ENCOUNTER
"REASON FOR CONVERSATION: Abdominal Pain    SYMPTOMS:   Name of Medication: “What is the medication you are calling about?” Zepbound    Dosage: “What dose are you currently taking” 10mg    Question: “What is your question?” (Medication refill, side effect, reaction) ?side effect    Prescribing HCP: “Who prescribed it?” TRUNG Haynes PA-C    Symptoms: “Do you have any symptoms?” RLQ/R groin non-radiating pain since yesterday afternoon, gradual onset. Feels like a \"strain.\" Pain 3-4/10 when sitting up/moving around; complete relief when laying down. Denies N&V, fevers/chills, dysuria.    Also endorses mild constipation, last BM 1.5 days ago. Took Miralax last night & stool softener last night and today. Had very BM today.     Also endorses mild HA.    PT looked up symptoms online and read that it may be r/t Zepbound.     Date of last injection: Monday into L arm    How many injections do you have left: 3    PROTOCOL DISPOSITION: Home Care    CARE ADVICE PROVIDED: Reassurance provided to PT with rationale that pain is reproducible. Encouraged to continue bowel regimen per directions on box. Also encouraged to increase fluid intake. PT verbalized understanding and agreeable to plan.   Advised to try placing heating pad on area to see if that also helps pain; PT does not have heating pad, but does have IcyHot and will try that. Call back with any new or worsening sxs.    PRACTICE FOLLOW-UP: 4 month follow-up 10/9/25      Reason for Disposition   Mild abdominal pain    Answer Assessment - Initial Assessment Questions  1. LOCATION: \"Where does it hurt?\"       RLQ/groin  2. RADIATION: \"Does the pain shoot anywhere else?\" (e.g., chest, back)      denies  3. ONSET: \"When did the pain begin?\" (e.g., minutes, hours or days ago)       Yesterday afternoon  4. SUDDEN: \"Gradual or sudden onset?\"      gradual  5. PATTERN \"Does the pain come and go, or is it constant?\"      intermittent  6. SEVERITY: \"How bad is the pain?\"  (e.g., Scale " "1-10; mild, moderate, or severe)      3-4/10  7. RECURRENT SYMPTOM: \"Have you ever had this type of stomach pain before?\" If Yes, ask: \"When was the last time?\" and \"What happened that time?\"       denies  8. CAUSE: \"What do you think is causing the stomach pain?\"      unsure  9. RELIEVING/AGGRAVATING FACTORS: \"What makes it better or worse?\" (e.g., antacids, bending or twisting motion, bowel movement)      Relief with laying   10. OTHER SYMPTOMS: \"Do you have any other symptoms?\" (e.g., back pain, diarrhea, fever, urination pain, vomiting)        headache  11. PREGNANCY: \"Is there any chance you are pregnant?\" \"When was your last menstrual period?\"        no    Protocols used: Abdominal Pain - Female-Adult-[place]    "